# Patient Record
Sex: MALE | Race: WHITE | HISPANIC OR LATINO | Employment: FULL TIME | URBAN - METROPOLITAN AREA
[De-identification: names, ages, dates, MRNs, and addresses within clinical notes are randomized per-mention and may not be internally consistent; named-entity substitution may affect disease eponyms.]

---

## 2020-03-19 ENCOUNTER — OFFICE VISIT (OUTPATIENT)
Dept: URGENT CARE | Facility: CLINIC | Age: 25
End: 2020-03-19
Payer: COMMERCIAL

## 2020-03-19 VITALS
RESPIRATION RATE: 16 BRPM | SYSTOLIC BLOOD PRESSURE: 130 MMHG | TEMPERATURE: 99 F | OXYGEN SATURATION: 96 % | DIASTOLIC BLOOD PRESSURE: 80 MMHG | WEIGHT: 255 LBS | BODY MASS INDEX: 35.7 KG/M2 | HEIGHT: 71 IN | HEART RATE: 92 BPM

## 2020-03-19 DIAGNOSIS — K52.9 GASTROENTERITIS: ICD-10-CM

## 2020-03-19 PROCEDURE — 99203 OFFICE O/P NEW LOW 30 MIN: CPT | Performed by: PHYSICIAN ASSISTANT

## 2020-03-19 ASSESSMENT — ENCOUNTER SYMPTOMS
NAUSEA: 1
DIARRHEA: 1
VOMITING: 1
HEARTBURN: 0
BLOOD IN STOOL: 0
COUGH: 0
ABDOMINAL PAIN: 1
CONSTIPATION: 0
CHILLS: 0
FEVER: 0

## 2020-03-19 NOTE — PROGRESS NOTES
"Subjective:      Ryan Riddle is a 24 y.o. male who presents with Abdominal Pain (x1 day needs work note )            Patient is a 24-year-old male who presents to urgent care with nausea, vomiting and diarrhea after eating specific to spoiled chicken salad.  Consumption was 2 days ago of which yesterday patient reports notable symptoms however this subsided around 3 PM.  Patient denies any fevers, body aches and is able to tolerate food and water at this time.  Patient is feeling substantially better at this time however needs a work release.    Gastroenteritis    This is a new problem. Episode onset: 2 days ago. The problem has been resolved. The emesis has an appearance of bile. There has been no fever. Associated symptoms include abdominal pain and diarrhea. Pertinent negatives include no chills, coughing or fever. Associated symptoms comments: Crampy abdominal pain. Risk factors include suspect food intake. He has tried nothing for the symptoms.       Review of Systems   Constitutional: Negative for chills, fever and malaise/fatigue.   Respiratory: Negative for cough.    Gastrointestinal: Positive for abdominal pain, diarrhea, nausea and vomiting. Negative for blood in stool, constipation, heartburn and melena.   Genitourinary: Negative for dysuria and urgency.   All other systems reviewed and are negative.         Objective:     /80   Pulse 92   Temp 37.2 °C (99 °F) (Temporal)   Resp 16   Ht 1.803 m (5' 11\")   Wt 115.7 kg (255 lb)   SpO2 96%   BMI 35.57 kg/m²    PMH:  has a past medical history of Unspecified asthma(493.90).  MEDS: Reviewed .   ALLERGIES: No Known Allergies  SURGHX: No past surgical history on file.  SOCHX:  reports that he has never smoked. He has never used smokeless tobacco. He reports that he does not drink alcohol or use drugs.  FH: Family history was reviewed, no pertinent findings to report    Physical Exam  Vitals signs reviewed.   Constitutional:       General: " He is not in acute distress.     Appearance: He is well-developed.   HENT:      Head: Normocephalic and atraumatic.      Right Ear: External ear normal.      Left Ear: External ear normal.      Mouth/Throat:      Pharynx: No oropharyngeal exudate.   Eyes:      Conjunctiva/sclera: Conjunctivae normal.      Pupils: Pupils are equal, round, and reactive to light.   Neck:      Musculoskeletal: Normal range of motion and neck supple.      Trachea: No tracheal deviation.   Cardiovascular:      Rate and Rhythm: Normal rate and regular rhythm.      Heart sounds: No murmur.   Pulmonary:      Effort: Pulmonary effort is normal. No respiratory distress.      Breath sounds: Normal breath sounds.   Musculoskeletal: Normal range of motion.   Skin:     General: Skin is warm.      Findings: No rash.   Neurological:      Mental Status: He is alert and oriented to person, place, and time.      Coordination: Coordination normal.   Psychiatric:         Behavior: Behavior normal.         Thought Content: Thought content normal.         Judgment: Judgment normal.                 Assessment/Plan:       1. Gastroenteritis    Symptoms have since resolved.  Work note provided for patient to return to work immediately as he is currently asymptomatic for 24 hours.  Patient is to return to clinic for any further concern or issues.    RTC PRN.

## 2020-03-19 NOTE — LETTER
March 19, 2020         Patient: Ryan Riddle   YOB: 1995   Date of Visit: 3/19/2020           To Whom it May Concern:    Ryan Riddle was seen in my clinic on 3/19/2020. Please excuse this patient from work due to recent ailment, he is completely asymptomatic and may return to work immediately.     If you have any questions or concerns, please don't hesitate to call.        Sincerely,           Eliceo Arce P.A.-C.  Electronically Signed

## 2020-09-08 ENCOUNTER — APPOINTMENT (OUTPATIENT)
Dept: RADIOLOGY | Facility: MEDICAL CENTER | Age: 25
End: 2020-09-08
Attending: EMERGENCY MEDICINE
Payer: COMMERCIAL

## 2020-09-08 ENCOUNTER — HOSPITAL ENCOUNTER (EMERGENCY)
Facility: MEDICAL CENTER | Age: 25
End: 2020-09-08
Attending: EMERGENCY MEDICINE
Payer: COMMERCIAL

## 2020-09-08 VITALS
DIASTOLIC BLOOD PRESSURE: 75 MMHG | HEART RATE: 72 BPM | TEMPERATURE: 98.9 F | BODY MASS INDEX: 36.36 KG/M2 | HEIGHT: 71 IN | OXYGEN SATURATION: 96 % | SYSTOLIC BLOOD PRESSURE: 143 MMHG | RESPIRATION RATE: 16 BRPM | WEIGHT: 259.7 LBS

## 2020-09-08 DIAGNOSIS — K57.92 DIVERTICULITIS: ICD-10-CM

## 2020-09-08 DIAGNOSIS — R10.32 LEFT LOWER QUADRANT ABDOMINAL PAIN: ICD-10-CM

## 2020-09-08 LAB
ALBUMIN SERPL BCP-MCNC: 4.2 G/DL (ref 3.2–4.9)
ALBUMIN/GLOB SERPL: 1.1 G/DL
ALP SERPL-CCNC: 68 U/L (ref 30–99)
ALT SERPL-CCNC: 24 U/L (ref 2–50)
ANION GAP SERPL CALC-SCNC: 13 MMOL/L (ref 7–16)
APPEARANCE UR: CLEAR
AST SERPL-CCNC: 18 U/L (ref 12–45)
BASOPHILS # BLD AUTO: 0.3 % (ref 0–1.8)
BASOPHILS # BLD: 0.03 K/UL (ref 0–0.12)
BILIRUB SERPL-MCNC: 0.4 MG/DL (ref 0.1–1.5)
BILIRUB UR QL STRIP.AUTO: NEGATIVE
BUN SERPL-MCNC: 9 MG/DL (ref 8–22)
CALCIUM SERPL-MCNC: 9.5 MG/DL (ref 8.4–10.2)
CHLORIDE SERPL-SCNC: 103 MMOL/L (ref 96–112)
CO2 SERPL-SCNC: 25 MMOL/L (ref 20–33)
COLOR UR: YELLOW
CREAT SERPL-MCNC: 0.74 MG/DL (ref 0.5–1.4)
EOSINOPHIL # BLD AUTO: 0.17 K/UL (ref 0–0.51)
EOSINOPHIL NFR BLD: 1.6 % (ref 0–6.9)
ERYTHROCYTE [DISTWIDTH] IN BLOOD BY AUTOMATED COUNT: 38.6 FL (ref 35.9–50)
GLOBULIN SER CALC-MCNC: 3.7 G/DL (ref 1.9–3.5)
GLUCOSE SERPL-MCNC: 105 MG/DL (ref 65–99)
GLUCOSE UR STRIP.AUTO-MCNC: NEGATIVE MG/DL
HCT VFR BLD AUTO: 43.3 % (ref 42–52)
HGB BLD-MCNC: 14.9 G/DL (ref 14–18)
IMM GRANULOCYTES # BLD AUTO: 0.04 K/UL (ref 0–0.11)
IMM GRANULOCYTES NFR BLD AUTO: 0.4 % (ref 0–0.9)
KETONES UR STRIP.AUTO-MCNC: NEGATIVE MG/DL
LEUKOCYTE ESTERASE UR QL STRIP.AUTO: NEGATIVE
LIPASE SERPL-CCNC: 19 U/L (ref 7–58)
LYMPHOCYTES # BLD AUTO: 1.98 K/UL (ref 1–4.8)
LYMPHOCYTES NFR BLD: 18.6 % (ref 22–41)
MCH RBC QN AUTO: 30.4 PG (ref 27–33)
MCHC RBC AUTO-ENTMCNC: 34.4 G/DL (ref 33.7–35.3)
MCV RBC AUTO: 88.4 FL (ref 81.4–97.8)
MICRO URNS: NORMAL
MONOCYTES # BLD AUTO: 0.76 K/UL (ref 0–0.85)
MONOCYTES NFR BLD AUTO: 7.1 % (ref 0–13.4)
NEUTROPHILS # BLD AUTO: 7.68 K/UL (ref 1.82–7.42)
NEUTROPHILS NFR BLD: 72 % (ref 44–72)
NITRITE UR QL STRIP.AUTO: NEGATIVE
NRBC # BLD AUTO: 0 K/UL
NRBC BLD-RTO: 0 /100 WBC
PH UR STRIP.AUTO: 6.5 [PH] (ref 5–8)
PLATELET # BLD AUTO: 263 K/UL (ref 164–446)
PMV BLD AUTO: 10 FL (ref 9–12.9)
POTASSIUM SERPL-SCNC: 3.8 MMOL/L (ref 3.6–5.5)
PROT SERPL-MCNC: 7.9 G/DL (ref 6–8.2)
PROT UR QL STRIP: NEGATIVE MG/DL
RBC # BLD AUTO: 4.9 M/UL (ref 4.7–6.1)
RBC UR QL AUTO: NEGATIVE
SODIUM SERPL-SCNC: 141 MMOL/L (ref 135–145)
SP GR UR STRIP.AUTO: <=1.005
WBC # BLD AUTO: 10.7 K/UL (ref 4.8–10.8)

## 2020-09-08 PROCEDURE — 85025 COMPLETE CBC W/AUTO DIFF WBC: CPT

## 2020-09-08 PROCEDURE — 99284 EMERGENCY DEPT VISIT MOD MDM: CPT

## 2020-09-08 PROCEDURE — 81003 URINALYSIS AUTO W/O SCOPE: CPT

## 2020-09-08 PROCEDURE — 76870 US EXAM SCROTUM: CPT

## 2020-09-08 PROCEDURE — 96375 TX/PRO/DX INJ NEW DRUG ADDON: CPT

## 2020-09-08 PROCEDURE — 96374 THER/PROPH/DIAG INJ IV PUSH: CPT

## 2020-09-08 PROCEDURE — 80053 COMPREHEN METABOLIC PANEL: CPT

## 2020-09-08 PROCEDURE — 87591 N.GONORRHOEAE DNA AMP PROB: CPT

## 2020-09-08 PROCEDURE — 700111 HCHG RX REV CODE 636 W/ 250 OVERRIDE (IP)

## 2020-09-08 PROCEDURE — 87491 CHLMYD TRACH DNA AMP PROBE: CPT

## 2020-09-08 PROCEDURE — 83690 ASSAY OF LIPASE: CPT

## 2020-09-08 PROCEDURE — 74176 CT ABD & PELVIS W/O CONTRAST: CPT

## 2020-09-08 PROCEDURE — 700111 HCHG RX REV CODE 636 W/ 250 OVERRIDE (IP): Performed by: EMERGENCY MEDICINE

## 2020-09-08 RX ORDER — CIPROFLOXACIN 500 MG/1
500 TABLET, FILM COATED ORAL 2 TIMES DAILY
Qty: 20 TAB | Refills: 0 | Status: SHIPPED | OUTPATIENT
Start: 2020-09-08 | End: 2020-09-08 | Stop reason: SDUPTHER

## 2020-09-08 RX ORDER — KETOROLAC TROMETHAMINE 30 MG/ML
INJECTION, SOLUTION INTRAMUSCULAR; INTRAVENOUS
Status: COMPLETED
Start: 2020-09-08 | End: 2020-09-08

## 2020-09-08 RX ORDER — CIPROFLOXACIN 500 MG/1
500 TABLET, FILM COATED ORAL 2 TIMES DAILY
Qty: 20 TAB | Refills: 0 | Status: SHIPPED | OUTPATIENT
Start: 2020-09-08 | End: 2020-09-18

## 2020-09-08 RX ORDER — KETOROLAC TROMETHAMINE 30 MG/ML
30 INJECTION, SOLUTION INTRAMUSCULAR; INTRAVENOUS ONCE
Status: COMPLETED | OUTPATIENT
Start: 2020-09-08 | End: 2020-09-08

## 2020-09-08 RX ORDER — ONDANSETRON 2 MG/ML
4 INJECTION INTRAMUSCULAR; INTRAVENOUS ONCE
Status: COMPLETED | OUTPATIENT
Start: 2020-09-08 | End: 2020-09-08

## 2020-09-08 RX ORDER — ALBUTEROL SULFATE 90 UG/1
2 AEROSOL, METERED RESPIRATORY (INHALATION) EVERY 6 HOURS PRN
COMMUNITY
End: 2021-03-30 | Stop reason: SDUPTHER

## 2020-09-08 RX ORDER — HYDROMORPHONE HYDROCHLORIDE 1 MG/ML
1 INJECTION, SOLUTION INTRAMUSCULAR; INTRAVENOUS; SUBCUTANEOUS ONCE
Status: COMPLETED | OUTPATIENT
Start: 2020-09-08 | End: 2020-09-08

## 2020-09-08 RX ORDER — METRONIDAZOLE 500 MG/1
500 TABLET ORAL 3 TIMES DAILY
Qty: 30 TAB | Refills: 0 | Status: SHIPPED | OUTPATIENT
Start: 2020-09-08 | End: 2020-09-18

## 2020-09-08 RX ORDER — METRONIDAZOLE 500 MG/1
500 TABLET ORAL 3 TIMES DAILY
Qty: 30 TAB | Refills: 0 | Status: SHIPPED | OUTPATIENT
Start: 2020-09-08 | End: 2020-09-08 | Stop reason: SDUPTHER

## 2020-09-08 RX ORDER — HYDROCODONE BITARTRATE AND ACETAMINOPHEN 5; 325 MG/1; MG/1
1 TABLET ORAL EVERY 6 HOURS PRN
Qty: 10 TAB | Refills: 0 | Status: SHIPPED | OUTPATIENT
Start: 2020-09-08 | End: 2020-09-13

## 2020-09-08 RX ORDER — ONDANSETRON 4 MG/1
4 TABLET, FILM COATED ORAL EVERY 6 HOURS PRN
Qty: 10 TAB | Refills: 0 | Status: SHIPPED | OUTPATIENT
Start: 2020-09-08 | End: 2020-09-13

## 2020-09-08 RX ADMIN — HYDROMORPHONE HYDROCHLORIDE 1 MG: 1 INJECTION, SOLUTION INTRAMUSCULAR; INTRAVENOUS; SUBCUTANEOUS at 14:25

## 2020-09-08 RX ADMIN — KETOROLAC TROMETHAMINE 30 MG: 30 INJECTION, SOLUTION INTRAMUSCULAR; INTRAVENOUS at 14:26

## 2020-09-08 RX ADMIN — ONDANSETRON 4 MG: 2 INJECTION INTRAMUSCULAR; INTRAVENOUS at 14:24

## 2020-09-08 RX ADMIN — KETOROLAC TROMETHAMINE 30 MG: 30 INJECTION, SOLUTION INTRAMUSCULAR at 14:26

## 2020-09-08 NOTE — ED TRIAGE NOTES
Pt reports L sided abd pain x 4 days that now wraps down into groin area. Denies n/v/d or fever.    Pt denies covid symptoms or known exposure.

## 2020-09-08 NOTE — ED PROVIDER NOTES
"ED Provider Note    CHIEF COMPLAINT  Chief Complaint   Patient presents with   • Abdominal Pain       HPI  Ryan Washington is a 25 y.o. male who presents for evaluation of abdominal pain.  Patient states over the last 4 days he has been having left lower quadrant abdominal pain which is worsened over the last 24 hours.  He states the pain will radiate into the left hemiscrotum and he does have some testicular pain.  Patient states he feels like he may have a little bit of dysuria.  He denies: Fever, URI symptoms, cardiorespiratory symptoms, nausea, vomiting, hematemesis, melena hematochezia, penile discharge, flank pain.  No other acute symptomatology or complaints.    REVIEW OF SYSTEMS  See HPI for further details.  The patient denies: Hypertension, diabetes, thyroid dysfunction, seizures, cardiopulmonary disorders, previous gastrointestinal disorders, previous kidney or liver disorders.  All other systems negative.    PAST MEDICAL HISTORY  Past Medical History:   Diagnosis Date   • Unspecified asthma(493.90)        FAMILY HISTORY  No family history on file.    SOCIAL HISTORY  No history of tobacco, alcohol, drug abuse; the patient is monogamous with his girlfriend;    SURGICAL HISTORY  No past surgical history on file.    CURRENT MEDICATIONS  See nurse's notes    ALLERGIES  No Known Allergies    PHYSICAL EXAM  VITAL SIGNS: /72   Pulse 85   Temp 37.2 °C (98.9 °F) (Temporal)   Resp 16   Ht 1.803 m (5' 11\")   Wt 117.8 kg (259 lb 11.2 oz)   SpO2 97%   BMI 36.22 kg/m²    Constitutional: 25-year-old  male, well developed, Well nourished, No acute distress, Non-toxic appearance.   HENT: ,Atraumatic, Bilateral external ears normal, tympanic membranes clear, Oropharynx moist, No oral exudates, Nose normal.   Eyes: PERRL, EOMI, Conjunctiva normal, No discharge.   Neck: Normal range of motion, No tenderness, Supple, No stridor.   Lymphatic: No lymphadenopathy noted.   Cardiovascular: Normal heart " rate, Normal rhythm, No murmurs, No rubs, No gallops.   Thorax & Lungs: Normal Equal breath sounds, No respiratory distress, No wheezing, no stridor, no rales. No chest tenderness.   Abdomen: Mild tenderness in the left lower quadrant area, nondistended, no organomegaly, positive bowel sounds normal in quality. No guarding or rebound.  Skin: Good skin turgor, pink, warm, dry. No rashes, petechiae, purpura. Normal capillary refill.   Back: No tenderness, No CVA tenderness.   Genitalia: External genitalia appear normal, mild tenderness in the left hemiscrotum but no obvious masses or swelling identified, the penis is uncircumcised without lesions. No discharge.   Extremities: Intact distal pulses, No edema, No tenderness, No cyanosis, No clubbing. Vascular: Pulses are 2+, symmetric in the upper and lower extremities.  Musculoskeletal: Good range of motion in all major joints. No tenderness to palpation or major deformities noted.   Neurologic: Alert & oriented x 3, Normal motor function, Normal sensory function, No gross focal deficits noted.   Psychiatric: Affect normal, Judgment normal, Mood normal.     RADIOLOGY/PROCEDURES  CT-RENAL COLIC EVALUATION(A/P W/O)   Final Result      1.  Inflammatory change adjacent to the sigmoid colon most consistent with acute diverticulitis      2.  No other finding      [id number]   Final Result      1.  No evidence of testicular mass or torsion.      2.  3 mm left epididymal head cyst.            COURSE & MEDICAL DECISION MAKING  Pertinent Labs & Imaging studies reviewed. (See chart for details)  1.  Monitor  2.  Saline lock  3.  Zofran 4 mg IV, titrated  4.  Dilaudid 1 mg IV  5.  Toradol 30 mg IV    Laboratory studies: CBC shows white count of 10.7, 72% neutrophils, 18% lymphocytes, 7% monocytes, hemoglobin 14.9, crit 43.3; CMP shows random glucose 105 globulin 3.7, otherwise within normal; lipase 19;    Discussion: At this time, the patient presents for evaluation of  abdominal pain.  Patient has mild tenderness in the scrotum but no masses or swelling or erythema identified.  Ultrasonography shows no evidence of mass or torsion.  There is a small epididymal cyst.  CT scanning does show inflammatory changes adjacent to the sigmoid colon most consistent with diverticulitis.  There is no evidence of perforation or abscess formation or other complications or conditions.  Based on the patient's clinical findings, I feel we can attempt a trial of outpatient therapy.  I discussed the findings and treatment plan with the patient.  He indicates he is comfortable with this explanation disposition.    FINAL IMPRESSION  1. Left lower quadrant abdominal pain Acute   2. Diverticulitis Acute          PLAN  1.  Appropriate discharge instructions given  2.  Cipro 500 mg twice daily #20  3.  Flagyl 501 g 3 times daily #30  4.  Zofran 4 mg #10  5.  Lortab 5 mg #10;  databank reviewed; informed consent obtained;  6.  Follow-up with primary care  7.  Recheck if change or worsening of symptoms  8.  Follow-up with gastroenterology    Electronically signed by: Guy G Gansert, M.D., 9/8/2020 2:04 PM

## 2020-09-08 NOTE — ED NOTES
Pt given discharge instructions. RN answered questions. VSS. Pt ambulated steadily out to [facility].

## 2020-09-09 LAB
C TRACH DNA SPEC QL NAA+PROBE: NEGATIVE
N GONORRHOEA DNA SPEC QL NAA+PROBE: NEGATIVE
SPECIMEN SOURCE: NORMAL

## 2021-03-05 ENCOUNTER — TELEPHONE (OUTPATIENT)
Dept: SCHEDULING | Facility: IMAGING CENTER | Age: 26
End: 2021-03-05

## 2021-03-25 SDOH — HEALTH STABILITY: PHYSICAL HEALTH: ON AVERAGE, HOW MANY DAYS PER WEEK DO YOU ENGAGE IN MODERATE TO STRENUOUS EXERCISE (LIKE A BRISK WALK)?: 3 DAYS

## 2021-03-25 SDOH — HEALTH STABILITY: MENTAL HEALTH
STRESS IS WHEN SOMEONE FEELS TENSE, NERVOUS, ANXIOUS, OR CAN'T SLEEP AT NIGHT BECAUSE THEIR MIND IS TROUBLED. HOW STRESSED ARE YOU?: DECLINE

## 2021-03-25 SDOH — ECONOMIC STABILITY: INCOME INSECURITY: IN THE LAST 12 MONTHS, WAS THERE A TIME WHEN YOU WERE NOT ABLE TO PAY THE MORTGAGE OR RENT ON TIME?: NO

## 2021-03-25 SDOH — ECONOMIC STABILITY: TRANSPORTATION INSECURITY
IN THE PAST 12 MONTHS, HAS LACK OF RELIABLE TRANSPORTATION KEPT YOU FROM MEDICAL APPOINTMENTS, MEETINGS, WORK OR FROM GETTING THINGS NEEDED FOR DAILY LIVING?: NO

## 2021-03-25 SDOH — ECONOMIC STABILITY: TRANSPORTATION INSECURITY
IN THE PAST 12 MONTHS, HAS THE LACK OF TRANSPORTATION KEPT YOU FROM MEDICAL APPOINTMENTS OR FROM GETTING MEDICATIONS?: NO

## 2021-03-25 SDOH — ECONOMIC STABILITY: HOUSING INSECURITY
IN THE LAST 12 MONTHS, WAS THERE A TIME WHEN YOU DID NOT HAVE A STEADY PLACE TO SLEEP OR SLEPT IN A SHELTER (INCLUDING NOW)?: NO

## 2021-03-25 SDOH — HEALTH STABILITY: PHYSICAL HEALTH: ON AVERAGE, HOW MANY MINUTES DO YOU ENGAGE IN EXERCISE AT THIS LEVEL?: 60 MINUTES

## 2021-03-25 SDOH — ECONOMIC STABILITY: HOUSING INSECURITY

## 2021-03-25 ASSESSMENT — SOCIAL DETERMINANTS OF HEALTH (SDOH)
DO YOU BELONG TO ANY CLUBS OR ORGANIZATIONS SUCH AS CHURCH GROUPS UNIONS, FRATERNAL OR ATHLETIC GROUPS, OR SCHOOL GROUPS?: NO
HOW MANY DRINKS CONTAINING ALCOHOL DO YOU HAVE ON A TYPICAL DAY WHEN YOU ARE DRINKING: 1 OR 2
HOW OFTEN DO YOU GET TOGETHER WITH FRIENDS OR RELATIVES?: TWICE A WEEK
WITHIN THE PAST 12 MONTHS, THE FOOD YOU BOUGHT JUST DIDN'T LAST AND YOU DIDN'T HAVE MONEY TO GET MORE: NEVER TRUE
WITHIN THE PAST 12 MONTHS, YOU WORRIED THAT YOUR FOOD WOULD RUN OUT BEFORE YOU GOT THE MONEY TO BUY MORE: NEVER TRUE
ARE YOU MARRIED, WIDOWED, DIVORCED, SEPARATED, NEVER MARRIED, OR LIVING WITH A PARTNER?: NEVER MARRIED
HOW OFTEN DO YOU HAVE A DRINK CONTAINING ALCOHOL: MONTHLY OR LESS
HOW OFTEN DO YOU ATTENT MEETINGS OF THE CLUB OR ORGANIZATION YOU BELONG TO?: NEVER
IN A TYPICAL WEEK, HOW MANY TIMES DO YOU TALK ON THE PHONE WITH FAMILY, FRIENDS, OR NEIGHBORS?: NEVER
HOW OFTEN DO YOU ATTEND CHURCH OR RELIGIOUS SERVICES?: NEVER
HOW HARD IS IT FOR YOU TO PAY FOR THE VERY BASICS LIKE FOOD, HOUSING, MEDICAL CARE, AND HEATING?: NOT HARD AT ALL
HOW OFTEN DO YOU HAVE SIX OR MORE DRINKS ON ONE OCCASION: NEVER

## 2021-03-30 ENCOUNTER — OFFICE VISIT (OUTPATIENT)
Dept: MEDICAL GROUP | Facility: MEDICAL CENTER | Age: 26
End: 2021-03-30
Payer: COMMERCIAL

## 2021-03-30 VITALS
OXYGEN SATURATION: 98 % | WEIGHT: 248.35 LBS | HEIGHT: 71 IN | SYSTOLIC BLOOD PRESSURE: 162 MMHG | DIASTOLIC BLOOD PRESSURE: 88 MMHG | BODY MASS INDEX: 34.77 KG/M2 | RESPIRATION RATE: 18 BRPM | TEMPERATURE: 97.7 F | HEART RATE: 82 BPM

## 2021-03-30 DIAGNOSIS — Z13.6 ENCOUNTER FOR LIPID SCREENING FOR CARDIOVASCULAR DISEASE: ICD-10-CM

## 2021-03-30 DIAGNOSIS — Z13.1 SCREENING FOR DIABETES MELLITUS: ICD-10-CM

## 2021-03-30 DIAGNOSIS — J45.30 MILD PERSISTENT ASTHMA WITHOUT COMPLICATION: ICD-10-CM

## 2021-03-30 DIAGNOSIS — Z13.21 ENCOUNTER FOR VITAMIN DEFICIENCY SCREENING: ICD-10-CM

## 2021-03-30 DIAGNOSIS — R03.0 ELEVATED BLOOD PRESSURE READING IN OFFICE WITHOUT DIAGNOSIS OF HYPERTENSION: ICD-10-CM

## 2021-03-30 DIAGNOSIS — E66.9 OBESITY (BMI 30-39.9): ICD-10-CM

## 2021-03-30 DIAGNOSIS — Z13.220 ENCOUNTER FOR LIPID SCREENING FOR CARDIOVASCULAR DISEASE: ICD-10-CM

## 2021-03-30 PROCEDURE — 99214 OFFICE O/P EST MOD 30 MIN: CPT | Performed by: FAMILY MEDICINE

## 2021-03-30 RX ORDER — MONTELUKAST SODIUM 10 MG/1
10 TABLET ORAL EVERY EVENING
Qty: 30 TABLET | Refills: 2 | Status: SHIPPED | OUTPATIENT
Start: 2021-03-30 | End: 2021-05-28

## 2021-03-30 RX ORDER — ALBUTEROL SULFATE 90 UG/1
2 AEROSOL, METERED RESPIRATORY (INHALATION) EVERY 6 HOURS PRN
Qty: 8.5 G | Refills: 4 | Status: SHIPPED | OUTPATIENT
Start: 2021-03-30 | End: 2021-12-02

## 2021-03-30 ASSESSMENT — ENCOUNTER SYMPTOMS
WEIGHT LOSS: 0
BLURRED VISION: 0
PALPITATIONS: 0
DIARRHEA: 0
MYALGIAS: 0
DIZZINESS: 0
SHORTNESS OF BREATH: 0
DEPRESSION: 0
DOUBLE VISION: 0
CONSTIPATION: 0
NAUSEA: 0
FEVER: 0
WEAKNESS: 0
COUGH: 0
BRUISES/BLEEDS EASILY: 0
CHILLS: 0

## 2021-03-30 ASSESSMENT — PATIENT HEALTH QUESTIONNAIRE - PHQ9: CLINICAL INTERPRETATION OF PHQ2 SCORE: 0

## 2021-03-30 ASSESSMENT — FIBROSIS 4 INDEX: FIB4 SCORE: 0.36

## 2021-03-30 NOTE — LETTER
[facility]  DARLING RothRSawyerN.  [address]  [place] [address]  Fax: [phone]   Authorization for Release/Disclosure of   Protected Health Information   Name: RYAN WASHINGTON : 1995 SSN: [id number]   Address: [address] [place] [address]  [place] [address] Phone:    There are no phone numbers on file.   I authorize the entity listed below to release/disclose the PHI below to:   [facility]/LAZARO Roth and LAZARO Roth   Provider or Entity Name:     Address   City, State, Zip   Phone:      Fax:     Reason for request: continuity of care   Information to be released:    [  ] LAST COLONOSCOPY,  including any PATH REPORT and follow-up  [  ] LAST FIT/COLOGUARD RESULT [  ] LAST DEXA  [  ] LAST MAMMOGRAM  [  ] LAST PAP  [  ] LAST LABS [  ] RETINA EXAM REPORT  [  ] IMMUNIZATION RECORDS  [  ] Release all info      [  ] Check here and initial the line next to each item to release ALL health information INCLUDING  _____ Care and treatment for drug and / or alcohol abuse  _____ HIV testing, infection status, or AIDS  _____ Genetic Testing    DATES OF SERVICE OR TIME PERIOD TO BE DISCLOSED: _____________  I understand and acknowledge that:  * This Authorization may be revoked at any time by you in writing, except if your health information has already been used or disclosed.  * Your health information that will be used or disclosed as a result of you signing this authorization could be re-disclosed by the recipient. If this occurs, your re-disclosed health information may no longer be protected by State or Federal laws.  * You may refuse to sign this Authorization. Your refusal will not affect your ability to obtain treatment.  * This Authorization becomes effective upon signing and will  on (date) __________.      If no date is indicated, this Authorization will  one (1) year from the signature date.    Name: Ryan Washington    Signature:   Date:      3/30/2021       PLEASE FAX REQUESTED RECORDS BACK TO: [phone]

## 2021-03-31 NOTE — ASSESSMENT & PLAN NOTE
Patient reports he was diagnosed with asthma as a child.  He was on albuterol inhaler as needed and started on Asmanex over a year ago by his previous provider.  He feels that the Asmanex and albuterol do a good job of controlling his asthma symptoms.  Uses the albuterol inhaler approx 3 to 4 times a week, with no night time awakening and it has been a few years since he has taken oral steroids.  Worsening of his asthma symptoms during allergy season, but is able to control this with Flonase nasal spray.    Denies shortness of breath, wheezing, difficulty breathing.

## 2021-03-31 NOTE — ASSESSMENT & PLAN NOTE
He has started to lose weight since his diagnosis with diverticulitis.    Diet: He eats lots of fruits, and will occasionally eat vegetables.  He avoids red meats but does eats a lot of chicken.  He does report that he eats a lot of rice but wants to try quinoa as a rice replacement.      Exercise: 3 to 4 days a week, approx 1 hr.  Goes to the gym, and Monday will play softball.

## 2021-03-31 NOTE — ASSESSMENT & PLAN NOTE
Patient with elevated blood pressure of 162/88.  He currently denies any chest pain, shortness of breath, headache, vision changes, diaphoresis.

## 2021-03-31 NOTE — PROGRESS NOTES
Ryan Washington is a pleasant 25 y.o. male here to establish care.    HPI:    Mild persistent asthma without complication  Patient reports he was diagnosed with asthma as a child.  He was on albuterol inhaler as needed and started on Asmanex over a year ago by his previous provider.  He feels that the Asmanex and albuterol do a good job of controlling his asthma symptoms.  Uses the albuterol inhaler approx 3 to 4 times a week, with no night time awakening and it has been a few years since he has taken oral steroids.  Worsening of his asthma symptoms during allergy season, but is able to control this with Flonase nasal spray.    Denies shortness of breath, wheezing, difficulty breathing.    Obesity (BMI 30-39.9)  He has started to lose weight since his diagnosis with diverticulitis.    Diet: He eats lots of fruits, and will occasionally eat vegetables.  He avoids red meats but does eats a lot of chicken.  He does report that he eats a lot of rice but wants to try quinoa as a rice replacement.      Exercise: 3 to 4 days a week, approx 1 hr.  Goes to the gym, and Monday will play softball.        Elevated blood pressure reading in office without diagnosis of hypertension  Patient with elevated blood pressure of 162/88.  He currently denies any chest pain, shortness of breath, headache, vision changes, diaphoresis.      Current medicines (including changes today)  Current Outpatient Medications   Medication Sig Dispense Refill   • Fluticasone Propionate (FLONASE ALLERGY RELIEF NA) Administer  into affected nostril(S).     • Psyllium (METAMUCIL PO) Take  by mouth.     • albuterol 108 (90 Base) MCG/ACT Aero Soln inhalation aerosol Inhale 2 Puffs every 6 hours as needed for Shortness of Breath. 8.5 g 4   • mometasone (ASMANEX, 30 METERED DOSES,) 220 MCG/INH inhaler Inhale 1 Puff every bedtime. 3 Each 1   • montelukast (SINGULAIR) 10 MG Tab Take 1 tablet by mouth every evening. 30 tablet 2     No current  "facility-administered medications for this visit.       Past Medical/ Surgical History  He  has a past medical history of Diverticulitis and Unspecified asthma(493.90).  He  has no past surgical history on file.    Social History  Social History     Tobacco Use   • Smoking status: Never Smoker   • Smokeless tobacco: Former User   Substance Use Topics   • Alcohol use: Not Currently     Comment: heavy drinker prior to diagnosis   • Drug use: Yes     Types: Marijuana, Inhaled     Social History     Social History Narrative   • Not on file        Family History  Family History   Problem Relation Age of Onset   • Diabetes Mother    • No Known Problems Father    • Diabetes Maternal Grandmother    • Diabetes Maternal Grandfather    • Diabetes Paternal Grandfather    • Other Brother         diverticulitis   • Sleep Apnea Brother      Family Status   Relation Name Status   • Mo  Alive   • Fa  Alive   • Sis Altagracia Alive   • Bro Nino Alive   • MGMo     • MGFa     • PGMo     • PGFa     • Bro Philippe Alive   • Bro serena Alive         Review of Systems   Constitutional: Negative for chills, fever and weight loss.   HENT: Negative for hearing loss.    Eyes: Negative for blurred vision and double vision.   Respiratory: Negative for cough and shortness of breath.    Cardiovascular: Negative for chest pain, palpitations and leg swelling.   Gastrointestinal: Negative for constipation, diarrhea and nausea.   Genitourinary: Negative.    Musculoskeletal: Negative for myalgias.   Skin: Negative for rash.   Neurological: Negative for dizziness and weakness.   Endo/Heme/Allergies: Does not bruise/bleed easily.   Psychiatric/Behavioral: Negative for depression.         Objective:     BP (!) 162/88 (BP Location: Left arm, Patient Position: Sitting, BP Cuff Size: Adult)   Pulse 82   Temp 36.5 °C (97.7 °F) (Temporal)   Resp 18   Ht 1.803 m (5' 11\")   Wt 113 kg (248 lb 5.6 oz)   SpO2 98%  Body mass index is 34.64 " kg/m².    Physical Exam   Constitutional: He is oriented to person, place, and time and well-developed, well-nourished, and in no distress. No distress.   HENT:   Head: Normocephalic and atraumatic.   Right Ear: External ear normal.   Left Ear: External ear normal.   Eyes: Pupils are equal, round, and reactive to light. Conjunctivae are normal.   Cardiovascular: Normal rate and regular rhythm. Exam reveals no gallop and no friction rub.   No murmur heard.  Pulmonary/Chest: Effort normal and breath sounds normal. He has no wheezes. He has no rales.   Abdominal: Soft. Bowel sounds are normal. There is no abdominal tenderness.   Musculoskeletal:      Cervical back: Normal range of motion and neck supple.   Neurological: He is alert and oriented to person, place, and time. Gait normal.   Skin: Skin is warm and dry. No rash noted.   Acanthosis nigricans noted to the back of patient's neck   Psychiatric: Affect normal.      Labs:  Most recent labs from 09/28/2020 reviewed.    Assessment and Plan:   The following treatment plan was discussed    1. Mild persistent asthma without complication  Chronic, unstable.  I discussed with the patient about not wanting to be on an inhaled corticosteroid for a long period of time.  Due to patient's increase in asthma symptoms during allergy season I went ahead and prescribed him montelukast to take daily.  We will discuss further adjustments to his asthma medication regime and her next appointment.  - albuterol 108 (90 Base) MCG/ACT Aero Soln inhalation aerosol; Inhale 2 Puffs every 6 hours as needed for Shortness of Breath.  Dispense: 8.5 g; Refill: 4  - mometasone (ASMANEX, 30 METERED DOSES,) 220 MCG/INH inhaler; Inhale 1 Puff every bedtime.  Dispense: 3 Each; Refill: 1  - montelukast (SINGULAIR) 10 MG Tab; Take 1 tablet by mouth every evening.  Dispense: 30 tablet; Refill: 2    2. Obesity (BMI 30-39.9)  Chronic, improving.  Patient states that he has started to lose weight since  changing his diet and increasing his physical activity.  I further encouraged him to continue making these changes and will see further improvement with his weight and overall health.  - Patient identified as having weight management issue.  Appropriate orders and counseling given.    3. Screening for diabetes mellitus  Acanthosis nigricans noted to the back of patient's neck along with a glucose level drawn 6 months ago at 105.  Due to patient's significant family history for diabetes we will check an A1c.  - HEMOGLOBIN A1C; Future    4. Encounter for lipid screening for cardiovascular disease  - Lipid Profile; Future    5. Encounter for vitamin deficiency screening  - VITAMIN D,25 HYDROXY; Future    6. Elevated blood pressure reading in office without diagnosis of hypertension  Patient reports that he has a home BP monitor, I advised that he obtain several different blood pressure readings at different times and bring these back to me with her follow-up appointment in 4 weeks.      Records requested.  Followup: Return in about 4 weeks (around 4/27/2021) for Blood work and BP.    I have placed the above orders and the MA is performing these under the direction of Dr. Farley    Please note that this dictation was created using voice recognition software. I have made every reasonable attempt to correct obvious errors, but I expect that there are errors of grammar and possibly content that I did not discover before finalizing the note.

## 2021-05-01 ENCOUNTER — HOSPITAL ENCOUNTER (OUTPATIENT)
Dept: LAB | Facility: MEDICAL CENTER | Age: 26
End: 2021-05-01
Attending: FAMILY MEDICINE
Payer: COMMERCIAL

## 2021-05-01 DIAGNOSIS — Z13.6 ENCOUNTER FOR LIPID SCREENING FOR CARDIOVASCULAR DISEASE: ICD-10-CM

## 2021-05-01 DIAGNOSIS — Z13.220 ENCOUNTER FOR LIPID SCREENING FOR CARDIOVASCULAR DISEASE: ICD-10-CM

## 2021-05-01 DIAGNOSIS — Z13.21 ENCOUNTER FOR VITAMIN DEFICIENCY SCREENING: ICD-10-CM

## 2021-05-01 DIAGNOSIS — Z13.1 SCREENING FOR DIABETES MELLITUS: ICD-10-CM

## 2021-05-01 LAB
CHOLEST SERPL-MCNC: 130 MG/DL (ref 100–199)
EST. AVERAGE GLUCOSE BLD GHB EST-MCNC: 105 MG/DL
FASTING STATUS PATIENT QL REPORTED: NORMAL
HBA1C MFR BLD: 5.3 % (ref 4–5.6)
HDLC SERPL-MCNC: 32 MG/DL
LDLC SERPL CALC-MCNC: 88 MG/DL
TRIGL SERPL-MCNC: 49 MG/DL (ref 0–149)

## 2021-05-01 PROCEDURE — 82306 VITAMIN D 25 HYDROXY: CPT

## 2021-05-01 PROCEDURE — 80061 LIPID PANEL: CPT

## 2021-05-01 PROCEDURE — 83036 HEMOGLOBIN GLYCOSYLATED A1C: CPT

## 2021-05-01 PROCEDURE — 36415 COLL VENOUS BLD VENIPUNCTURE: CPT

## 2021-05-04 LAB — 25(OH)D3 SERPL-MCNC: 14 NG/ML (ref 30–80)

## 2021-05-05 ENCOUNTER — OFFICE VISIT (OUTPATIENT)
Dept: MEDICAL GROUP | Facility: MEDICAL CENTER | Age: 26
End: 2021-05-05
Payer: COMMERCIAL

## 2021-05-05 VITALS
DIASTOLIC BLOOD PRESSURE: 68 MMHG | WEIGHT: 235.89 LBS | HEIGHT: 71 IN | SYSTOLIC BLOOD PRESSURE: 102 MMHG | HEART RATE: 81 BPM | OXYGEN SATURATION: 97 % | BODY MASS INDEX: 33.02 KG/M2 | RESPIRATION RATE: 17 BRPM | TEMPERATURE: 98.1 F

## 2021-05-05 DIAGNOSIS — R03.0 ELEVATED BLOOD PRESSURE READING IN OFFICE WITHOUT DIAGNOSIS OF HYPERTENSION: ICD-10-CM

## 2021-05-05 DIAGNOSIS — E55.9 VITAMIN D INSUFFICIENCY: ICD-10-CM

## 2021-05-05 DIAGNOSIS — E66.9 OBESITY (BMI 30-39.9): ICD-10-CM

## 2021-05-05 PROCEDURE — 99212 OFFICE O/P EST SF 10 MIN: CPT | Performed by: FAMILY MEDICINE

## 2021-05-05 RX ORDER — MULTIVIT-MIN/IRON/FOLIC ACID/K 18-600-40
CAPSULE ORAL
COMMUNITY

## 2021-05-05 ASSESSMENT — ENCOUNTER SYMPTOMS
PALPITATIONS: 0
DIARRHEA: 0
BRUISES/BLEEDS EASILY: 0
FEVER: 0
DIZZINESS: 0
COUGH: 0
WEAKNESS: 0
SHORTNESS OF BREATH: 0
BLURRED VISION: 0
DEPRESSION: 0
DOUBLE VISION: 0
WEIGHT LOSS: 0
MYALGIAS: 0
CHILLS: 0
NAUSEA: 0
CONSTIPATION: 0

## 2021-05-05 ASSESSMENT — FIBROSIS 4 INDEX: FIB4 SCORE: 0.36

## 2021-05-05 NOTE — PROGRESS NOTES
Ryan Washington is a pleasant 25 y.o. male here for lab review.    HPI:    Obesity (BMI 30-39.9)  Patient reports that he has been making substantial improvements to his diet by increasing his leafy greens and cutting back high fat high carb foods.  He has been going to the gym frequently and plays softball on Mondays.  He states that he has a very positive outlook and wants to continue making healthy changes for himself.  He states that he has already started to notice improvements in his outlook.    Elevated blood pressure reading in office without diagnosis of hypertension  Patient's blood pressure in clinic is 102/68, this has been an improvement since his previous visit.       Vitamin D insufficiency  Patient recently had blood work completed which showed of low vitamin D level of 14.  Patient has been taking a daily vitamin D supplementation to help correct this.        Current Medicines (including changes today)  Current Outpatient Medications   Medication Sig Dispense Refill   • Cholecalciferol (VITAMIN D) 50 MCG (2000 UT) Cap Take  by mouth.     • Fluticasone Propionate (FLONASE ALLERGY RELIEF NA) Administer  into affected nostril(S).     • Psyllium (METAMUCIL PO) Take  by mouth.     • albuterol 108 (90 Base) MCG/ACT Aero Soln inhalation aerosol Inhale 2 Puffs every 6 hours as needed for Shortness of Breath. 8.5 g 4   • mometasone (ASMANEX, 30 METERED DOSES,) 220 MCG/INH inhaler Inhale 1 Puff every bedtime. 3 Each 1   • montelukast (SINGULAIR) 10 MG Tab Take 1 tablet by mouth every evening. 30 tablet 2     No current facility-administered medications for this visit.     Past Medical/ Surgical History  He  has a past medical history of Diverticulitis and Unspecified asthma(493.90).  He  has no past surgical history on file.    Review of Systems   Constitutional: Negative for chills, fever and weight loss.   HENT: Negative for hearing loss.    Eyes: Negative for blurred vision and double vision.  "  Respiratory: Negative for cough and shortness of breath.    Cardiovascular: Negative for chest pain, palpitations and leg swelling.   Gastrointestinal: Negative for constipation, diarrhea and nausea.   Genitourinary: Negative.    Musculoskeletal: Negative for myalgias.   Skin: Negative for rash.   Neurological: Negative for dizziness and weakness.   Endo/Heme/Allergies: Does not bruise/bleed easily.   Psychiatric/Behavioral: Negative for depression.         Objective:     /68 (BP Location: Left arm, Patient Position: Sitting, BP Cuff Size: Adult)   Pulse 81   Temp 36.7 °C (98.1 °F)   Resp 17   Ht 1.803 m (5' 11\")   Wt 107 kg (235 lb 14.3 oz)   SpO2 97%  Body mass index is 32.9 kg/m².    Physical Exam   Constitutional: He is oriented to person, place, and time and well-developed, well-nourished, and in no distress. No distress.   HENT:   Head: Normocephalic and atraumatic.   Eyes: Pupils are equal, round, and reactive to light. Conjunctivae are normal.   Pulmonary/Chest: Effort normal. No respiratory distress.   Abdominal: He exhibits no distension.   Musculoskeletal:      Cervical back: Normal range of motion and neck supple.   Neurological: He is alert and oriented to person, place, and time. Gait normal.   Skin: Skin is warm and dry. No rash noted.   Psychiatric: Affect normal.      Imaging:  No imaging to review    Labs  Recent labs from 05/01/2021 reviewed with the patient.    Results for AYAKA LOVE (MRN [id number]) as of 5/5/2021 16:47   Ref. Range 5/1/2021 08:22   Glycohemoglobin Latest Ref Range: 4.0 - 5.6 % 5.3   Estim. Avg Glu Latest Units: mg/dL 105   Cholesterol,Tot Latest Ref Range: 100 - 199 mg/dL 130   Triglycerides Latest Ref Range: 0 - 149 mg/dL 49   HDL Latest Ref Range: >=40 mg/dL 32 (A)   LDL Latest Ref Range: <100 mg/dL 88   25-Hydroxy   Vitamin D 25 Latest Ref Range: 30 - 80 ng/mL 14 (L)     Assessment and Plan:   The following treatment plan was discussed    1. Vitamin D " insufficiency  New to the patient.  I recommended that he take vitamin D supplementation up to 2000 units daily.  We will go ahead and repeat a vitamin D in 6 months.  - VITAMIN D,25 HYDROXY; Future    2. Obesity (BMI 30-39.9)  Chronic, improving.  Patient sounding very encouraging regarding his weight and increasing physical activity.  I encouraged that he continue with the changes that he has made.    3. Elevated blood pressure reading in office without diagnosis of hypertension  Patient's blood pressure is normal in office.      Records requested.  Followup: Return in about 6 months (around 11/5/2021) for blood work.    Please note that this dictation was created using voice recognition software. I have made every reasonable attempt to correct obvious errors, but I expect that there are errors of grammar and possibly content that I did not discover before finalizing the note.

## 2021-05-05 NOTE — ASSESSMENT & PLAN NOTE
Patient recently had blood work completed which showed of low vitamin D level of 14.  Patient has been taking a daily vitamin D supplementation to help correct this.

## 2021-05-05 NOTE — ASSESSMENT & PLAN NOTE
Patient reports that he has been making substantial improvements to his diet by increasing his leafy greens and cutting back high fat high carb foods.  He has been going to the gym frequently and plays softball on Mondays.  He states that he has a very positive outlook and wants to continue making healthy changes for himself.  He states that he has already started to notice improvements in his outlook.   n/a

## 2021-05-05 NOTE — ASSESSMENT & PLAN NOTE
Patient's blood pressure in clinic is 102/68, this has been an improvement since his previous visit.

## 2021-05-27 DIAGNOSIS — J45.30 MILD PERSISTENT ASTHMA WITHOUT COMPLICATION: ICD-10-CM

## 2021-05-28 RX ORDER — MONTELUKAST SODIUM 10 MG/1
TABLET ORAL
Qty: 30 TABLET | Refills: 2 | Status: SHIPPED | OUTPATIENT
Start: 2021-05-28 | End: 2021-08-26

## 2021-06-01 DIAGNOSIS — J45.30 MILD PERSISTENT ASTHMA WITHOUT COMPLICATION: ICD-10-CM

## 2021-06-01 NOTE — TELEPHONE ENCOUNTER
Received request via: Pharmacy    Was the patient seen in the last year in this department? Yes    Does the patient have an active prescription (recently filled or refills available) for medication(s) requested? No     Requested Prescriptions     Pending Prescriptions Disp Refills   • ASMANEX, 30 METERED DOSES, 220 MCG/INH inhaler [Pharmacy Med Name: ASMANEX TWISTHALER 220 MCG #30]  5     Sig: TAKE 1 PUFF BY MOUTH EVERY DAY AT BEDTIME

## 2021-06-19 ENCOUNTER — HOSPITAL ENCOUNTER (EMERGENCY)
Facility: MEDICAL CENTER | Age: 26
End: 2021-06-19
Attending: EMERGENCY MEDICINE
Payer: COMMERCIAL

## 2021-06-19 VITALS
WEIGHT: 233.69 LBS | BODY MASS INDEX: 32.72 KG/M2 | DIASTOLIC BLOOD PRESSURE: 86 MMHG | TEMPERATURE: 97.4 F | OXYGEN SATURATION: 97 % | HEART RATE: 69 BPM | RESPIRATION RATE: 18 BRPM | HEIGHT: 71 IN | SYSTOLIC BLOOD PRESSURE: 136 MMHG

## 2021-06-19 DIAGNOSIS — L02.91 ABSCESS: ICD-10-CM

## 2021-06-19 LAB
GRAM STN SPEC: NORMAL
SIGNIFICANT IND 70042: NORMAL
SITE SITE: NORMAL
SOURCE SOURCE: NORMAL

## 2021-06-19 PROCEDURE — 87070 CULTURE OTHR SPECIMN AEROBIC: CPT

## 2021-06-19 PROCEDURE — 87205 SMEAR GRAM STAIN: CPT

## 2021-06-19 PROCEDURE — 99283 EMERGENCY DEPT VISIT LOW MDM: CPT

## 2021-06-19 PROCEDURE — 87186 SC STD MICRODIL/AGAR DIL: CPT

## 2021-06-19 PROCEDURE — A9270 NON-COVERED ITEM OR SERVICE: HCPCS | Performed by: EMERGENCY MEDICINE

## 2021-06-19 PROCEDURE — 700102 HCHG RX REV CODE 250 W/ 637 OVERRIDE(OP): Performed by: EMERGENCY MEDICINE

## 2021-06-19 PROCEDURE — 303977 HCHG I & D

## 2021-06-19 PROCEDURE — 87077 CULTURE AEROBIC IDENTIFY: CPT

## 2021-06-19 PROCEDURE — 700101 HCHG RX REV CODE 250: Performed by: EMERGENCY MEDICINE

## 2021-06-19 PROCEDURE — 87147 CULTURE TYPE IMMUNOLOGIC: CPT

## 2021-06-19 RX ORDER — SULFAMETHOXAZOLE AND TRIMETHOPRIM 800; 160 MG/1; MG/1
1 TABLET ORAL 2 TIMES DAILY
Qty: 20 TABLET | Refills: 0 | Status: SHIPPED | OUTPATIENT
Start: 2021-06-19 | End: 2021-06-29

## 2021-06-19 RX ORDER — SULFAMETHOXAZOLE AND TRIMETHOPRIM 800; 160 MG/1; MG/1
1 TABLET ORAL ONCE
Status: COMPLETED | OUTPATIENT
Start: 2021-06-19 | End: 2021-06-19

## 2021-06-19 RX ORDER — LIDOCAINE HYDROCHLORIDE AND EPINEPHRINE 10; 10 MG/ML; UG/ML
20 INJECTION, SOLUTION INFILTRATION; PERINEURAL ONCE
Status: COMPLETED | OUTPATIENT
Start: 2021-06-19 | End: 2021-06-19

## 2021-06-19 RX ORDER — AMOXICILLIN AND CLAVULANATE POTASSIUM 875; 125 MG/1; MG/1
1 TABLET, FILM COATED ORAL 2 TIMES DAILY
Qty: 20 TABLET | Refills: 0 | Status: SHIPPED | OUTPATIENT
Start: 2021-06-19 | End: 2021-06-29

## 2021-06-19 RX ORDER — AMOXICILLIN AND CLAVULANATE POTASSIUM 875; 125 MG/1; MG/1
1 TABLET, FILM COATED ORAL ONCE
Status: COMPLETED | OUTPATIENT
Start: 2021-06-19 | End: 2021-06-19

## 2021-06-19 RX ADMIN — LIDOCAINE HYDROCHLORIDE AND EPINEPHRINE 20 ML: 10; 10 INJECTION, SOLUTION INFILTRATION; PERINEURAL at 12:00

## 2021-06-19 RX ADMIN — AMOXICILLIN AND CLAVULANATE POTASSIUM 1 TABLET: 875; 125 TABLET, FILM COATED ORAL at 11:47

## 2021-06-19 RX ADMIN — SULFAMETHOXAZOLE AND TRIMETHOPRIM 1 TABLET: 800; 160 TABLET ORAL at 11:47

## 2021-06-19 ASSESSMENT — FIBROSIS 4 INDEX: FIB4 SCORE: 0.36

## 2021-06-19 NOTE — ED NOTES
Dc instructions reviewed with pt. Aware of need to  meds at [facility] [place] , chg dressing t least daily, f/u with pcp and return for dressing /packing removal in 2 days

## 2021-06-19 NOTE — LETTER
6/21/2021               Ryan Maurisio Padmini  [address] [place] [address]        Dear Ryan (MR#[id number])    This letter is sent in regards to your recent visit to the [facility] Emergency Department on 6/19/2021. During the visit, tests were performed to assist the physician in your medical diagnosis. A review of your tests requires that we notify you of the following:    Your wound culture was POSITIVE for a bacteria called Methicillin Resistant Staphylococcus aureus (MRSA). The antibiotic prescribed for you (sulfamethoxazole-trimethoprim and amoxicillin-clavulanate) should be active to treat this bacteria. It is important that you continue taking your antibiotic until it is finished.     Please feel free to contact me at the number below if you have any questions or concerns. Thank you for your cooperation in the matter.    Sincerely,  ED Culture Follow-Up Staff  Arthur Martinez, PharmD    [facility], Emergency Department  [address] [place] [phone] (ED Culture Line)

## 2021-06-19 NOTE — ED PROVIDER NOTES
ED Provider Note    CHIEF COMPLAINT  Chief Complaint   Patient presents with   • Abscess       HPI  Ryan Washington is a 25 y.o. male who presents to the emergency department complaining of a boil in the left axillary area.  This is been going on for about 2 weeks the area has been, red and swollen and painful.  Several weeks ago the patient had a similar process which spontaneously ruptured and drained and he did not seek medical attention at that time.    REVIEW OF SYSTEMS no fever or chills he does not recognize any specific exacerbating or alleviating factors or precipitating events    PAST MEDICAL HISTORY  Past Medical History:   Diagnosis Date   • Diverticulitis     dx in 2020 with hospital admission   • Unspecified asthma(493.90)        FAMILY HISTORY  Family History   Problem Relation Age of Onset   • Diabetes Mother    • No Known Problems Father    • Diabetes Maternal Grandmother    • Diabetes Maternal Grandfather    • Diabetes Paternal Grandfather    • Other Brother         diverticulitis   • Sleep Apnea Brother        SOCIAL HISTORY  Social History     Socioeconomic History   • Marital status: Single     Spouse name: Not on file   • Number of children: Not on file   • Years of education: Not on file   • Highest education level: 12th grade   Occupational History   • Not on file   Tobacco Use   • Smoking status: Never Smoker   • Smokeless tobacco: Former User   Vaping Use   • Vaping Use: Never used   Substance and Sexual Activity   • Alcohol use: Not Currently   • Drug use: Yes     Types: Marijuana, Inhaled     Comment: Marijuana   • Sexual activity: Not Currently     Partners: Female   Other Topics Concern   • Not on file   Social History Narrative   • Not on file     Social Determinants of Health     Financial Resource Strain: Low Risk    • Difficulty of Paying Living Expenses: Not hard at all   Food Insecurity: No Food Insecurity   • Worried About Running Out of Food in the Last Year: Never true  "  • Ran Out of Food in the Last Year: Never true   Transportation Needs: No Transportation Needs   • Lack of Transportation (Medical): No   • Lack of Transportation (Non-Medical): No   Physical Activity: Sufficiently Active   • Days of Exercise per Week: 3 days   • Minutes of Exercise per Session: 60 min   Stress: Unknown   • Feeling of Stress : Patient refused   Social Connections: Socially Isolated   • Frequency of Communication with Friends and Family: Never   • Frequency of Social Gatherings with Friends and Family: Twice a week   • Attends Holiness Services: Never   • Active Member of Clubs or Organizations: No   • Attends Club or Organization Meetings: Never   • Marital Status: Never    Intimate Partner Violence:    • Fear of Current or Ex-Partner:    • Emotionally Abused:    • Physically Abused:    • Sexually Abused:        SURGICAL HISTORY  History reviewed. No pertinent surgical history.    CURRENT MEDICATIONS  Home Medications    **Home medications have not yet been reviewed for this encounter**         ALLERGIES  No Known Allergies    PHYSICAL EXAM  VITAL SIGNS: /86   Pulse 69   Temp 36.3 °C (97.4 °F) (Temporal)   Resp 18   Ht 1.803 m (5' 11\")   Wt 106 kg (233 lb 11 oz)   SpO2 97%   BMI 32.59 kg/m²    Oxygen saturation is interpreted as adequate  Constitutional: Awake lucid verbal pleasant individual  Cardiovascular: Regular rate and rhythm  Lungs: Clear and equal bilaterally with no apparent difficulty breathing  Skin: In the left axillary area there is a golf ball sized area of swelling and fluctuance with a central pustule.  Findings are consistent with an axillary abscess.  Musculoskeletal: No acute bony deformity  Neurologic: Awake lucid verbal ambulatory    Laboratory  Wound culture was sent to the laboratory    PROCEDURES  Procedure was discussed with the patient and verbal consent obtained.  The area was locally infiltrated with lidocaine and epinephrine to achieve adequate " anesthesia.  Using sterile instruments I then made a 1 cm incision with the scalpel and drained a large amount of pus from the abscess cavity.  Culture was sent to the lab.  The abscess cavity was packed with sterile gauze packing and a bandage placed by nursing staff.    MEDICAL DECISION MAKING and DISPOSITION  Also in the emergency department the patient was started on Augmentin and Bactrim.  His tetanus booster was last updated in 2018.  At this point in time I think it is safe for him to go home I have offered prescription pain medications but he feels he will be okay with Tylenol and Motrin.  I have written prescriptions for a 10-day course of Bactrim and Augmentin.  I have advised the patient to return here in 2 days for wound check and packing removal.  If he has a high fever or feels he is having new or worsening symptoms he is to return immediately for recheck    IMPRESSION  1.  I&D of left axillary abscess with packing         Electronically signed by: Edwin Maguire M.D., 6/19/2021 12:18 PM

## 2021-06-19 NOTE — ED NOTES
erp to bedside for wound care-site I and d by same with packing and dressing by this rn. Pt for d/c after same

## 2021-06-19 NOTE — DISCHARGE INSTRUCTIONS
Use Tylenol and Motrin if needed for discomfort.  If you develop a high fever or feel sick or have new or worsening symptoms return immediately otherwise return here in 2 days for wound check and packing removal

## 2021-06-19 NOTE — ED TRIAGE NOTES
"Presents complaining of a left axillary painful mass (\"possible abscess\"), developing for the past 2 weeks.   Chief Complaint   Patient presents with   • Abscess   /72   Pulse 76   Temp 36.3 °C (97.4 °F) (Temporal)   Resp 18   Ht 1.803 m (5' 11\")   Wt 106 kg (233 lb 11 oz)   SpO2 98%   BMI 32.59 kg/m²           "

## 2021-06-21 ENCOUNTER — HOSPITAL ENCOUNTER (EMERGENCY)
Facility: MEDICAL CENTER | Age: 26
End: 2021-06-21
Payer: COMMERCIAL

## 2021-06-21 VITALS
SYSTOLIC BLOOD PRESSURE: 120 MMHG | WEIGHT: 233.69 LBS | DIASTOLIC BLOOD PRESSURE: 68 MMHG | HEART RATE: 70 BPM | TEMPERATURE: 97.6 F | BODY MASS INDEX: 32.72 KG/M2 | HEIGHT: 71 IN | OXYGEN SATURATION: 98 % | RESPIRATION RATE: 20 BRPM

## 2021-06-21 LAB
BACTERIA WND AEROBE CULT: ABNORMAL
BACTERIA WND AEROBE CULT: ABNORMAL
GRAM STN SPEC: ABNORMAL
SIGNIFICANT IND 70042: ABNORMAL
SITE SITE: ABNORMAL
SOURCE SOURCE: ABNORMAL

## 2021-06-21 PROCEDURE — 302449 STATCHG TRIAGE ONLY (STATISTIC)

## 2021-06-21 ASSESSMENT — FIBROSIS 4 INDEX: FIB4 SCORE: 0.36

## 2021-06-21 NOTE — ED TRIAGE NOTES
"Pt was treated for a left axillary abscess with packing approximately 2 days.  He returns today for removal, and denies any complications.    Chief Complaint   Patient presents with   • Wound Re-Check   • Packing Removal   /68   Pulse 70   Temp 36.4 °C (97.6 °F) (Temporal)   Resp 20   Ht 1.803 m (5' 11\")   Wt 106 kg (233 lb 11 oz)   SpO2 98%   BMI 32.59 kg/m²         "

## 2021-06-21 NOTE — ED NOTES
"ED Positive Culture Follow-up/Notification Note:    Date: 6/21/2021     Patient seen in the ED on 6/19/2021 for abscess.   1. Abscess       Discharge Medication List as of 6/19/2021 12:10 PM      START taking these medications    Details   amoxicillin-clavulanate (AUGMENTIN) 875-125 MG Tab Take 1 tablet by mouth 2 times a day for 10 days., Disp-20 tablet, R-0, Normal      sulfamethoxazole-trimethoprim (BACTRIM DS) 800-160 MG tablet Take 1 tablet by mouth 2 times a day for 10 days., Disp-20 tablet, R-0, Normal           Augmentin 875 mg and Bactrim DS PO x1 given in ER     Allergies: Patient has no known allergies.     Vitals:    06/19/21 1053 06/19/21 1055 06/19/21 1205   BP:  117/72 136/86   Pulse:  76 69   Resp:  18    Temp:  36.3 °C (97.4 °F)    TempSrc:  Temporal    SpO2:  98% 97%   Weight: 106 kg (233 lb 11 oz)     Height: 1.803 m (5' 11\")         Final cultures:   Results     Procedure Component Value Units Date/Time    CULTURE WOUND W/ GRAM STAIN [[id number]]  (Abnormal)  (Susceptibility) Collected: 06/19/21 1204    Order Status: Completed Specimen: Wound from Abscess Updated: 06/21/21 0805     Significant Indicator POS     Source WND     Site Left Axillary Abscess     Culture Result -     Gram Stain Result Few WBCs.  Few Gram positive cocci.       Culture Result Methicillin Resistant Staphylococcus aureus  Moderate growth      Susceptibility     Methicillin resistant staphylococcus aureus (1)     Antibiotic Interpretation Microscan Method Status    Ampicillin  [*]  Resistant >8 mcg/mL VERONICA Final    Amoxicillin/CA  [*]  Resistant >4/2 mcg/mL VERONICA Final    Azithromycin Resistant >4 mcg/mL VERONICA Final    Ceftriaxone  [*]  Resistant 32 mcg/mL VERONICA Final    Clindamycin Sensitive 0.5 mcg/mL VERONICA Final    Cephalothin  [*]  Resistant <=8 mcg/mL VERONICA Final    Cefoxitin Screen  [*]  Positive >4 mcg/mL VERONICA Final    Cefazolin Resistant <=8 mcg/mL VERONICA Final    Ciprofloxacin  [*]  Resistant >2 mcg/mL VERONICA Final    Cefepime " Resistant >16 mcg/mL VERONICA Final    Ceftaroline Sensitive <=0.5 mcg/mL VERONICA Final    Daptomycin Sensitive 1 mcg/mL VERONICA Final    Ampicillin/sulbactam Resistant 16/8 mcg/mL VERONICA Final    Erythromycin Resistant >4 mcg/mL VERONICA Final    Vancomycin Sensitive 1 mcg/mL VERONICA Final    Gentamicin  [*]  Sensitive <=4 mcg/mL VERONICA Final    Inducible Clindamycin Test  [*]  Negative <=4/0.5 mcg/mL VERONICA Final    Imipenem  [*]  Resistant <=4 mcg/mL VERONICA Final    Levofloxacin  [*]  Resistant >4 mcg/mL VERONICA Final    Linezolid  [*]  Sensitive 2 mcg/mL VERONICA Final    Meropenem  [*]  Resistant <=2 mcg/mL VERONICA Final    Oxacillin Resistant >2 mcg/mL VERONICA Final    Rifampin  [*]  Sensitive <=1 mcg/mL VERONICA Final    Synercid  [*]  Sensitive <=0.5 mcg/mL VERONICA Final    Trimeth/Sulfa Sensitive <=0.5/9.5 mcg/mL VERONICA Final    Tetracycline Sensitive <=4 mcg/mL VERONICA Final    Tigecycline  [*]  Sensitive <=0.25 mcg/mL VERONICA Final           [*]  Suppressed Antibiotic                 GRAM STAIN [[id number]] Collected: 06/19/21 1204    Order Status: Completed Specimen: Wound Updated: 06/19/21 1804     Significant Indicator .     Source WND     Site Left Axillary Abscess     Gram Stain Result Few WBCs.  Few Gram positive cocci.            Plan:   Bactrim will cover MRSA in wound, given redness around wound will also continue Augmentin to provide strep coverage.   Appropriate antibiotic therapy prescribed. No changes required based upon culture result.  Sent letter to patient to notify of positive culture result and encourage compliance with prescribed antibiotics.     Arthur Martinez, PharmD

## 2021-06-21 NOTE — ED NOTES
Pt instructed on wound care and return precautions, denied questions/concerns.  Pt thanked ERP and RN for care.  Pt dismissed.

## 2021-08-26 DIAGNOSIS — J45.30 MILD PERSISTENT ASTHMA WITHOUT COMPLICATION: ICD-10-CM

## 2021-08-26 RX ORDER — MONTELUKAST SODIUM 10 MG/1
TABLET ORAL
Qty: 90 TABLET | Refills: 3 | Status: SHIPPED | OUTPATIENT
Start: 2021-08-26 | End: 2022-09-06

## 2021-08-26 NOTE — TELEPHONE ENCOUNTER
Received request via: Pharmacy    Was the patient seen in the last year in this department? Yes    Does the patient have an active prescription (recently filled or refills available) for medication(s) requested? No    Requested Prescriptions     Pending Prescriptions Disp Refills   • montelukast (SINGULAIR) 10 MG Tab [Pharmacy Med Name: MONTELUKAST SOD 10 MG TABLET] 90 Tablet      Sig: TAKE 1 TABLET BY MOUTH EVERY DAY IN THE EVENING

## 2021-10-21 ENCOUNTER — OFFICE VISIT (OUTPATIENT)
Dept: MEDICAL GROUP | Facility: MEDICAL CENTER | Age: 26
End: 2021-10-21
Payer: COMMERCIAL

## 2021-10-21 VITALS
OXYGEN SATURATION: 95 % | BODY MASS INDEX: 31.42 KG/M2 | DIASTOLIC BLOOD PRESSURE: 68 MMHG | WEIGHT: 224.43 LBS | TEMPERATURE: 98 F | SYSTOLIC BLOOD PRESSURE: 110 MMHG | HEIGHT: 71 IN | HEART RATE: 78 BPM

## 2021-10-21 DIAGNOSIS — Z00.00 WELLNESS EXAMINATION: ICD-10-CM

## 2021-10-21 DIAGNOSIS — Z23 IMMUNIZATION DUE: ICD-10-CM

## 2021-10-21 DIAGNOSIS — J45.30 MILD PERSISTENT ASTHMA WITHOUT COMPLICATION: ICD-10-CM

## 2021-10-21 PROBLEM — R03.0 ELEVATED BLOOD PRESSURE READING IN OFFICE WITHOUT DIAGNOSIS OF HYPERTENSION: Status: RESOLVED | Noted: 2021-03-30 | Resolved: 2021-10-21

## 2021-10-21 PROCEDURE — 90686 IIV4 VACC NO PRSV 0.5 ML IM: CPT | Performed by: FAMILY MEDICINE

## 2021-10-21 PROCEDURE — 90471 IMMUNIZATION ADMIN: CPT | Performed by: FAMILY MEDICINE

## 2021-10-21 PROCEDURE — 99395 PREV VISIT EST AGE 18-39: CPT | Mod: 25 | Performed by: FAMILY MEDICINE

## 2021-10-21 ASSESSMENT — ENCOUNTER SYMPTOMS
DIZZINESS: 0
DEPRESSION: 0
TINGLING: 0
COUGH: 0
WEAKNESS: 0
CONSTIPATION: 0
SHORTNESS OF BREATH: 0
NERVOUS/ANXIOUS: 0
DIARRHEA: 0
BRUISES/BLEEDS EASILY: 0
NAUSEA: 0
MYALGIAS: 0
ABDOMINAL PAIN: 0
SORE THROAT: 0
PALPITATIONS: 0
CHILLS: 0
FEVER: 0
DOUBLE VISION: 0
WEIGHT LOSS: 0
VOMITING: 0
BLURRED VISION: 0

## 2021-10-21 ASSESSMENT — FIBROSIS 4 INDEX: FIB4 SCORE: 0.38

## 2021-10-21 NOTE — ASSESSMENT & PLAN NOTE
Patient reports that he only needs to use albuterol during fire season.  He keeps his inhaler with him and knows when he needs to use it.  He has not needed to use it since the summer.

## 2021-10-21 NOTE — PROGRESS NOTES
cc: well exam    Subjective:     Ryan Washington is a pleasant 26 y.o. male presents for a routine preventive health exam.    Mild persistent asthma without complication  Patient reports that he only needs to use albuterol during fire season.  He keeps his inhaler with him and knows when he needs to use it.  He has not needed to use it since the summer.    Health Maintenance  Advanced directive: N/A  Osteoporosis Screen/ DEXA: n/a   PT/vit D for falls prevention: n/a   Cholesterol Screening: Completed 05/01/2021, normal   Diabetes Screening: Completed 05/01/2021, Normal  AAA Screening: n/a   Aspirin Use: N/A  Diet: improved dieting, increase lean proteins and increase in fruits and veggies   Exercise: increase physical activity, noticeable weight loss  Substance Abuse: No concerns  Safe in relationship.  Seat belts, bike helmet, gun safety discussed.  Sun protection used.    Cancer screening  Colorectal Cancer Screening: No family hx   Lung Cancer Screening: Non-smoker   Cervical Cancer Screening: N/A   Breast Cancer Screening: No family hx  Prostate Cancer Screening/PSA: No family hx    Infectious disease screening/Immunizations  --STI Screening: No concerns  --Practices safe sex.  --HIV Screening: No concerns  --Hepatitis C Screening: N/A  --Immunizations:    Influenza: due    HPV:  None   Tetanus: Due 10/2028    Shingles: n/a    Pneumococcal : No risk factors     Other immunizations: Up to date with COVID vaccine     Review of Systems   Constitutional: Negative for chills, fever, malaise/fatigue and weight loss.   HENT: Negative for hearing loss and sore throat.    Eyes: Negative for blurred vision and double vision.   Respiratory: Negative for cough and shortness of breath.    Cardiovascular: Negative for chest pain, palpitations and leg swelling.   Gastrointestinal: Negative for abdominal pain, constipation, diarrhea, nausea and vomiting.   Musculoskeletal: Negative for myalgias.   Skin: Negative for  rash.   Neurological: Negative for dizziness, tingling and weakness.   Endo/Heme/Allergies: Does not bruise/bleed easily.   Psychiatric/Behavioral: Negative for depression. The patient is not nervous/anxious.           Current Outpatient Medications:   •  montelukast (SINGULAIR) 10 MG Tab, TAKE 1 TABLET BY MOUTH EVERY DAY IN THE EVENING, Disp: 90 Tablet, Rfl: 3  •  Cholecalciferol (VITAMIN D) 50 MCG (2000 UT) Cap, Take  by mouth., Disp: , Rfl:   •  Fluticasone Propionate (FLONASE ALLERGY RELIEF NA), Administer  into affected nostril(S)., Disp: , Rfl:   •  Psyllium (METAMUCIL PO), Take  by mouth., Disp: , Rfl:   •  albuterol 108 (90 Base) MCG/ACT Aero Soln inhalation aerosol, Inhale 2 Puffs every 6 hours as needed for Shortness of Breath., Disp: 8.5 g, Rfl: 4    No Known Allergies    Past Medical History:   Diagnosis Date   • Diverticulitis     dx in 2020 with hospital admission   • Unspecified asthma(493.90)      History reviewed. No pertinent surgical history.  Family History   Problem Relation Age of Onset   • Diabetes Mother    • No Known Problems Father    • Diabetes Maternal Grandmother    • Diabetes Maternal Grandfather    • Diabetes Paternal Grandfather    • Other Brother         diverticulitis   • Sleep Apnea Brother      Social History     Socioeconomic History   • Marital status: Single     Spouse name: Not on file   • Number of children: Not on file   • Years of education: Not on file   • Highest education level: 12th grade   Occupational History   • Not on file   Tobacco Use   • Smoking status: Never Smoker   • Smokeless tobacco: Former User   Vaping Use   • Vaping Use: Never used   Substance and Sexual Activity   • Alcohol use: Not Currently   • Drug use: Yes     Types: Marijuana, Inhaled     Comment: Marijuana   • Sexual activity: Not Currently     Partners: Female   Other Topics Concern   • Not on file   Social History Narrative   • Not on file     Social Determinants of Health     Financial Resource  "Strain: Low Risk    • Difficulty of Paying Living Expenses: Not hard at all   Food Insecurity: No Food Insecurity   • Worried About Running Out of Food in the Last Year: Never true   • Ran Out of Food in the Last Year: Never true   Transportation Needs: No Transportation Needs   • Lack of Transportation (Medical): No   • Lack of Transportation (Non-Medical): No   Physical Activity: Sufficiently Active   • Days of Exercise per Week: 3 days   • Minutes of Exercise per Session: 60 min   Stress: Unknown   • Feeling of Stress : Patient refused   Social Connections: Socially Isolated   • Frequency of Communication with Friends and Family: Never   • Frequency of Social Gatherings with Friends and Family: Twice a week   • Attends [employer] Services: Never   • Active Member of Clubs or Organizations: No   • Attends Club or Organization Meetings: Never   • Marital Status: Never    Intimate Partner Violence:    • Fear of Current or Ex-Partner:    • Emotionally Abused:    • Physically Abused:    • Sexually Abused:         Objective:     /68 (BP Location: Right arm, Patient Position: Sitting, BP Cuff Size: Adult)   Pulse 78   Temp 36.7 °C (98 °F)   Ht 1.803 m (5' 11\")   Wt 102 kg (224 lb 6.9 oz)   SpO2 95%  Body mass index is 31.3 kg/m².    Physical Exam  Constitutional:       General: He is not in acute distress.  HENT:      Head: Normocephalic and atraumatic.      Right Ear: External ear normal.      Left Ear: External ear normal.   Eyes:      Conjunctiva/sclera: Conjunctivae normal.      Pupils: Pupils are equal, round, and reactive to light.   Cardiovascular:      Rate and Rhythm: Normal rate and regular rhythm.      Heart sounds: No murmur heard.   No friction rub. No gallop.    Pulmonary:      Effort: Pulmonary effort is normal.      Breath sounds: Normal breath sounds. No wheezing or rales.   Abdominal:      General: Bowel sounds are normal.      Palpations: Abdomen is soft.      Tenderness: There is no " abdominal tenderness.   Musculoskeletal:      Cervical back: Normal range of motion and neck supple.   Skin:     General: Skin is warm and dry.      Findings: No rash.   Neurological:      Mental Status: He is alert and oriented to person, place, and time.      Gait: Gait is intact.   Psychiatric:         Mood and Affect: Affect normal.        Imaging:  No recent imaging to review    Labs:   No recent labs to review    Assessment/Plan:     1. Wellness examination  Patient Counseling:  --Discussed moderation in sodium/caffeine intake, saturated fat and cholesterol, caloric balance, sufficient fresh fruits/vegetables, fiber, iron, and 0.4-0.8mg of folate supplement per day (for females capable of pregnancy).  --Discussed brushing, flossing, and dental visits.   --Encouraged regular exercise.   --Discussed tobacco, alcohol, or other drug use; availability of treatment for abuse.   --Discussed sexually transmitted infections, partner selection, use of condoms, avoidance of unintended pregnancy and contraceptive alternatives.  --Injury prevention: Discussed safety belts, safety helmets, smoke detector, etc.    2. Mild persistent asthma without complication  Chronic, stable.  I recommended that the patient keep the albuterol inhaler on his person in the event that he does need to use his rescue inhaler.    3. Immunization due  Patient is due for his influenza vaccine we will go and get him updated today  - INFLUENZA VACCINE QUAD INJ (PF)       Preventive visit in 1 year, sooner as needed for any concerns.     I have placed vaccination orders.  The MA is preforming vaccination orders under the direction of Dr. Farley    Please note that this dictation was created using voice recognition software. I have made every reasonable attempt to correct obvious errors, but I expect that there are errors of grammar and possibly content that I did not discover before finalizing the note.

## 2021-12-02 DIAGNOSIS — J45.30 MILD PERSISTENT ASTHMA WITHOUT COMPLICATION: ICD-10-CM

## 2021-12-02 RX ORDER — ALBUTEROL SULFATE 90 UG/1
AEROSOL, METERED RESPIRATORY (INHALATION)
Qty: 8.5 EACH | Refills: 4 | Status: SHIPPED | OUTPATIENT
Start: 2021-12-02 | End: 2023-03-17 | Stop reason: SDUPTHER

## 2021-12-02 NOTE — TELEPHONE ENCOUNTER
Received request via: Pharmacy    Was the patient seen in the last year in this department? Yes  10/21/2021  Does the patient have an active prescription (recently filled or refills available) for medication(s) requested? No

## 2022-09-04 DIAGNOSIS — J45.30 MILD PERSISTENT ASTHMA WITHOUT COMPLICATION: ICD-10-CM

## 2022-09-06 RX ORDER — MONTELUKAST SODIUM 10 MG/1
TABLET ORAL
Qty: 90 TABLET | Refills: 3 | Status: SHIPPED | OUTPATIENT
Start: 2022-09-06 | End: 2023-03-17 | Stop reason: SDUPTHER

## 2023-03-17 ENCOUNTER — OFFICE VISIT (OUTPATIENT)
Dept: MEDICAL GROUP | Facility: MEDICAL CENTER | Age: 28
End: 2023-03-17
Payer: COMMERCIAL

## 2023-03-17 VITALS
RESPIRATION RATE: 16 BRPM | HEART RATE: 73 BPM | OXYGEN SATURATION: 97 % | TEMPERATURE: 98.2 F | SYSTOLIC BLOOD PRESSURE: 118 MMHG | WEIGHT: 232 LBS | HEIGHT: 71 IN | BODY MASS INDEX: 32.48 KG/M2 | DIASTOLIC BLOOD PRESSURE: 62 MMHG

## 2023-03-17 DIAGNOSIS — Z13.6 ENCOUNTER FOR LIPID SCREENING FOR CARDIOVASCULAR DISEASE: ICD-10-CM

## 2023-03-17 DIAGNOSIS — Z00.00 ENCOUNTER FOR PREVENTATIVE ADULT HEALTH CARE EXAMINATION: ICD-10-CM

## 2023-03-17 DIAGNOSIS — Z23 IMMUNIZATION DUE: ICD-10-CM

## 2023-03-17 DIAGNOSIS — Z13.1 SCREENING FOR DIABETES MELLITUS: ICD-10-CM

## 2023-03-17 DIAGNOSIS — J45.30 MILD PERSISTENT ASTHMA WITHOUT COMPLICATION: ICD-10-CM

## 2023-03-17 DIAGNOSIS — E55.9 VITAMIN D INSUFFICIENCY: ICD-10-CM

## 2023-03-17 DIAGNOSIS — Z13.0 SCREENING FOR DEFICIENCY ANEMIA: ICD-10-CM

## 2023-03-17 DIAGNOSIS — Z13.220 ENCOUNTER FOR LIPID SCREENING FOR CARDIOVASCULAR DISEASE: ICD-10-CM

## 2023-03-17 PROCEDURE — 99395 PREV VISIT EST AGE 18-39: CPT | Mod: 25 | Performed by: FAMILY MEDICINE

## 2023-03-17 PROCEDURE — 90471 IMMUNIZATION ADMIN: CPT | Performed by: FAMILY MEDICINE

## 2023-03-17 PROCEDURE — 90677 PCV20 VACCINE IM: CPT | Performed by: FAMILY MEDICINE

## 2023-03-17 NOTE — LETTER
March 17, 2023    To Whom It May Concern:         This is confirmation that Ryan Washington attended his scheduled appointment with LAZARO Roth on 3/17/23.  Please excuse him from work today for attending his scheduled appointment in the clinic.         If you have any questions please do not hesitate to call me at the phone number listed below.    Sincerely,          BONIFACIO Roth.  [phone]

## 2023-03-17 NOTE — ASSESSMENT & PLAN NOTE
Anticipatory guidance:  --Discussed moderation in sodium/caffeine intake, saturated fat and cholesterol, caloric balance, sufficient fresh fruits/vegetables, and fiber.  --Discussed brushing, flossing, and dental visits.   --Encouraged regular exercise, 150 mins a week of moderate to high intensity cardiovascular activity  --Discussed tobacco, alcohol, or other drug use; availability of treatment for abuse.   --Discussed sexually transmitted infections, partner selection, use of condoms, avoidance of unintended pregnancy and contraceptive alternatives.  --Injury prevention: Discussed safety belts, safety helmets, smoke detector, etc.  -Discussed diet and exercise and colorectal cancer screening     Health maintenance: Due for hep C, influenza, pneumonia, and COVID booster.  Immunizations per orders  Labs per orders

## 2023-03-17 NOTE — ASSESSMENT & PLAN NOTE
Chronic, Stable.  Continue taking vitamin D supplementations.  We will check vitamin D levels in 1 year.

## 2023-03-17 NOTE — ASSESSMENT & PLAN NOTE
Chronic, stable.  Albuterol 108 mcg/ACT aerosol solution inhalation 2 puffs every 4 hours as needed  Refill montelukast sent to his preferred pharmacy due to prediction of possible bad allergy season.

## 2023-03-17 NOTE — PROGRESS NOTES
Subjective:     CC:   Chief Complaint   Patient presents with    Annual Exam       HPI:   Ryan Washington is a 27 y.o. male who presents for annual exam    Problem   Encounter for Preventative Adult Health Care Examination    Last Colorectal Cancer Screening: Due at 44 years  Hx STDs: No  Cholesterol Screenin2021 LDL 88, HDL 32   Diabetes Screenin2021 A1C 5.3  Hep C Screening: Due    Exercise: Weights, Cardio  Diet: Regular      Urinary symptoms: None    Advanced directive: N/A  Substance Abuse: No concerns   Seat belts, bike helmet, gun safety discussed.  Sun protection used.  Routine Dental: [place] daily and follows with dentist    Immunizations  Influenza: Due    HPV:  up to date    Tetanus: 10/2018    Shingles: n/a    COVID: Due for booster  Pneumococcal : D    Hep B series: up to date   Other immunizations: None      Vitamin D Insufficiency    History of low vitamin D levels, currently taking vitamin D supplementation.     Mild Persistent Asthma Without Complication    History of asthma, would like a refill of his albuterol inhaler.  Has been doing pretty good in terms of his asthma control.  Has not been using the montelukast or need for Flonase at this time.          He  has a past medical history of Diverticulitis and Unspecified asthma(493.90).  He  has no past surgical history on file.    Family History   Problem Relation Age of Onset    Diabetes Mother     No Known Problems Father     Diabetes Maternal Grandmother     Diabetes Maternal Grandfather     Diabetes Paternal Grandfather     Other Brother         diverticulitis    Sleep Apnea Brother      Social History     Tobacco Use    Smoking status: Never    Smokeless tobacco: Former     Quit date: 3/30/2020   Vaping Use    Vaping Use: Never used   Substance Use Topics    Alcohol use: Not Currently    Drug use: Yes     Types: Marijuana, Inhaled     Comment: Marijuana     He  reports that he is not currently sexually active and has had  "partner(s) who are female.    Patient Active Problem List    Diagnosis Date Noted    Encounter for preventative adult health care examination 03/17/2023    Vitamin D insufficiency 05/05/2021    Mild persistent asthma without complication 03/30/2021    Obesity (BMI 30-39.9) 03/30/2021     Current Outpatient Medications   Medication Sig Dispense Refill    albuterol 108 (90 Base) MCG/ACT Aero Soln inhalation aerosol Inhale 2 Puffs every 6 hours as needed for Shortness of Breath. 8.5 Each 4    montelukast (SINGULAIR) 10 MG Tab Take 1 Tablet by mouth every evening. 90 Tablet 3    Cholecalciferol (VITAMIN D) 50 MCG (2000 UT) Cap Take  by mouth.      Fluticasone Propionate (FLONASE ALLERGY RELIEF NA) Administer  into affected nostril(S).      Psyllium (METAMUCIL PO) Take  by mouth.       No current facility-administered medications for this visit.     No Known Allergies    Review of Systems   Constitutional: Negative for fever, chills and malaise/fatigue.   HENT: Negative for congestion.    Eyes: Negative for pain.   Respiratory: Negative for cough and shortness of breath.    Cardiovascular: Negative for chest pain and leg swelling.   Gastrointestinal: Negative for nausea, vomiting, abdominal pain and diarrhea.   Genitourinary: Negative for dysuria and hematuria.   Skin: Negative for rash.   Neurological: Negative for dizziness, focal weakness and headaches.   Endo/Heme/Allergies: Does not bruise/bleed easily.   Psychiatric/Behavioral: Negative for depression.  The patient is not nervous/anxious.      Objective:   /62 (BP Location: Left arm, Patient Position: Sitting, BP Cuff Size: Adult)   Pulse 73   Temp 36.8 °C (98.2 °F) (Temporal)   Resp 16   Ht 1.803 m (5' 11\")   Wt 105 kg (232 lb)   SpO2 97%   BMI 32.36 kg/m²      Wt Readings from Last 4 Encounters:   03/17/23 104 kg (230 lb)   03/17/23 105 kg (232 lb)   10/21/21 102 kg (224 lb 6.9 oz)   06/19/21 106 kg (233 lb 11 oz)       Physical Exam  Constitutional: "       General: He is not in acute distress.  HENT:      Head: Normocephalic and atraumatic.      Right Ear: Tympanic membrane and external ear normal.      Left Ear: Tympanic membrane and external ear normal.   Eyes:      General: Lids are normal.      Extraocular Movements: Extraocular movements intact.      Conjunctiva/sclera: Conjunctivae normal.      Pupils: Pupils are equal, round, and reactive to light.   Neck:      Trachea: Trachea normal.   Cardiovascular:      Rate and Rhythm: Normal rate and regular rhythm.      Heart sounds: Normal heart sounds. No murmur heard.    No friction rub. No gallop.   Pulmonary:      Effort: Pulmonary effort is normal. No accessory muscle usage.      Breath sounds: Normal breath sounds. No wheezing or rales.   Abdominal:      General: Bowel sounds are normal.      Palpations: Abdomen is soft.      Tenderness: There is no abdominal tenderness.   Musculoskeletal:      Cervical back: Normal range of motion and neck supple.      Right lower leg: No edema.      Left lower leg: No edema.   Lymphadenopathy:      Cervical: No cervical adenopathy.   Skin:     General: Skin is warm and dry.      Findings: No rash.   Neurological:      General: No focal deficit present.      Mental Status: He is alert and oriented to person, place, and time. Mental status is at baseline.      GCS: GCS eye subscore is 4. GCS verbal subscore is 5. GCS motor subscore is 6.      Motor: No weakness.      Gait: Gait is intact.   Psychiatric:         Attention and Perception: Attention normal.         Mood and Affect: Mood and affect normal.         Speech: Speech normal.       Imaging:  No imaging    Labs  No up dated labs    Assessment and Plan:        Problem List Items Addressed This Visit       Mild persistent asthma without complication     Chronic, stable.  Albuterol 108 mcg/ACT aerosol solution inhalation 2 puffs every 4 hours as needed  Refill montelukast sent to his preferred pharmacy due to prediction  of possible bad allergy season.         Vitamin D insufficiency     Chronic, Stable.  Continue taking vitamin D supplementations.  We will check vitamin D levels in 1 year.         Relevant Orders    VITAMIN D,25 HYDROXY (DEFICIENCY)    Encounter for preventative adult health care examination     Anticipatory guidance:  --Discussed moderation in sodium/caffeine intake, saturated fat and cholesterol, caloric balance, sufficient fresh fruits/vegetables, and fiber.  --Discussed brushing, flossing, and dental visits.   --Encouraged regular exercise, 150 mins a week of moderate to high intensity cardiovascular activity  --Discussed tobacco, alcohol, or other drug use; availability of treatment for abuse.   --Discussed sexually transmitted infections, partner selection, use of condoms, avoidance of unintended pregnancy and contraceptive alternatives.  --Injury prevention: Discussed safety belts, safety helmets, smoke detector, etc.  -Discussed diet and exercise and colorectal cancer screening     Health maintenance: Due for hep C, influenza, pneumonia, and COVID booster.  Immunizations per orders  Labs per orders          Other Visit Diagnoses       Immunization due        Relevant Orders    Pneumococcal Conjugate Vaccine 20-Valent (19 yrs+) (Completed)    Screening for diabetes mellitus        Relevant Orders    Comp Metabolic Panel    ESTIMATED GFR    Screening for deficiency anemia        Relevant Orders    CBC WITH DIFFERENTIAL    Encounter for lipid screening for cardiovascular disease        Relevant Orders    Lipid Profile             Follow-up: Return in about 1 year (around 3/17/2024), or if symptoms worsen or fail to improve, for Annual.    Please note that this dictation was created using voice recognition software. I have made every reasonable attempt to correct obvious errors, but I expect that there are errors of grammar and possibly content that I did not discover before finalizing the note.

## 2024-03-09 ENCOUNTER — HOSPITAL ENCOUNTER (OUTPATIENT)
Dept: LAB | Facility: MEDICAL CENTER | Age: 29
End: 2024-03-09
Attending: FAMILY MEDICINE
Payer: COMMERCIAL

## 2024-03-09 DIAGNOSIS — Z13.0 SCREENING FOR DEFICIENCY ANEMIA: ICD-10-CM

## 2024-03-09 DIAGNOSIS — Z13.1 SCREENING FOR DIABETES MELLITUS: ICD-10-CM

## 2024-03-09 DIAGNOSIS — E55.9 VITAMIN D INSUFFICIENCY: ICD-10-CM

## 2024-03-09 DIAGNOSIS — Z13.220 ENCOUNTER FOR LIPID SCREENING FOR CARDIOVASCULAR DISEASE: ICD-10-CM

## 2024-03-09 DIAGNOSIS — Z13.6 ENCOUNTER FOR LIPID SCREENING FOR CARDIOVASCULAR DISEASE: ICD-10-CM

## 2024-03-09 LAB
25(OH)D3 SERPL-MCNC: 34 NG/ML (ref 30–100)
ALBUMIN SERPL BCP-MCNC: 4.3 G/DL (ref 3.2–4.9)
ALBUMIN/GLOB SERPL: 1.3 G/DL
ALP SERPL-CCNC: 73 U/L (ref 30–99)
ALT SERPL-CCNC: 13 U/L (ref 2–50)
ANION GAP SERPL CALC-SCNC: 8 MMOL/L (ref 7–16)
AST SERPL-CCNC: 19 U/L (ref 12–45)
BASOPHILS # BLD AUTO: 0.8 % (ref 0–1.8)
BASOPHILS # BLD: 0.05 K/UL (ref 0–0.12)
BILIRUB SERPL-MCNC: 0.3 MG/DL (ref 0.1–1.5)
BUN SERPL-MCNC: 14 MG/DL (ref 8–22)
CALCIUM ALBUM COR SERPL-MCNC: 9 MG/DL (ref 8.5–10.5)
CALCIUM SERPL-MCNC: 9.2 MG/DL (ref 8.5–10.5)
CHLORIDE SERPL-SCNC: 105 MMOL/L (ref 96–112)
CHOLEST SERPL-MCNC: 115 MG/DL (ref 100–199)
CO2 SERPL-SCNC: 27 MMOL/L (ref 20–33)
CREAT SERPL-MCNC: 0.79 MG/DL (ref 0.5–1.4)
EOSINOPHIL # BLD AUTO: 0.3 K/UL (ref 0–0.51)
EOSINOPHIL NFR BLD: 4.6 % (ref 0–6.9)
ERYTHROCYTE [DISTWIDTH] IN BLOOD BY AUTOMATED COUNT: 39.4 FL (ref 35.9–50)
GLOBULIN SER CALC-MCNC: 3.2 G/DL (ref 1.9–3.5)
GLUCOSE SERPL-MCNC: 94 MG/DL (ref 65–99)
HCT VFR BLD AUTO: 41.9 % (ref 42–52)
HDLC SERPL-MCNC: 44 MG/DL
HGB BLD-MCNC: 14.6 G/DL (ref 14–18)
IMM GRANULOCYTES # BLD AUTO: 0.02 K/UL (ref 0–0.11)
IMM GRANULOCYTES NFR BLD AUTO: 0.3 % (ref 0–0.9)
LDLC SERPL CALC-MCNC: 62 MG/DL
LYMPHOCYTES # BLD AUTO: 1.85 K/UL (ref 1–4.8)
LYMPHOCYTES NFR BLD: 28.4 % (ref 22–41)
MCH RBC QN AUTO: 31.6 PG (ref 27–33)
MCHC RBC AUTO-ENTMCNC: 34.8 G/DL (ref 32.3–36.5)
MCV RBC AUTO: 90.7 FL (ref 81.4–97.8)
MONOCYTES # BLD AUTO: 0.61 K/UL (ref 0–0.85)
MONOCYTES NFR BLD AUTO: 9.4 % (ref 0–13.4)
NEUTROPHILS # BLD AUTO: 3.69 K/UL (ref 1.82–7.42)
NEUTROPHILS NFR BLD: 56.5 % (ref 44–72)
NRBC # BLD AUTO: 0 K/UL
NRBC BLD-RTO: 0 /100 WBC (ref 0–0.2)
PLATELET # BLD AUTO: 271 K/UL (ref 164–446)
PMV BLD AUTO: 10.1 FL (ref 9–12.9)
POTASSIUM SERPL-SCNC: 4.5 MMOL/L (ref 3.6–5.5)
PROT SERPL-MCNC: 7.5 G/DL (ref 6–8.2)
RBC # BLD AUTO: 4.62 M/UL (ref 4.7–6.1)
SODIUM SERPL-SCNC: 140 MMOL/L (ref 135–145)
TRIGL SERPL-MCNC: 45 MG/DL (ref 0–149)
WBC # BLD AUTO: 6.5 K/UL (ref 4.8–10.8)

## 2024-03-09 PROCEDURE — 36415 COLL VENOUS BLD VENIPUNCTURE: CPT

## 2024-03-09 PROCEDURE — 82306 VITAMIN D 25 HYDROXY: CPT

## 2024-03-09 PROCEDURE — 80061 LIPID PANEL: CPT

## 2024-03-09 PROCEDURE — 85025 COMPLETE CBC W/AUTO DIFF WBC: CPT

## 2024-03-09 PROCEDURE — 80053 COMPREHEN METABOLIC PANEL: CPT

## 2024-03-22 ENCOUNTER — OFFICE VISIT (OUTPATIENT)
Dept: MEDICAL GROUP | Facility: MEDICAL CENTER | Age: 29
End: 2024-03-22
Payer: COMMERCIAL

## 2024-03-22 VITALS
SYSTOLIC BLOOD PRESSURE: 124 MMHG | WEIGHT: 213 LBS | DIASTOLIC BLOOD PRESSURE: 74 MMHG | HEART RATE: 87 BPM | BODY MASS INDEX: 29.71 KG/M2 | TEMPERATURE: 98.3 F | OXYGEN SATURATION: 98 %

## 2024-03-22 DIAGNOSIS — M25.562 CHRONIC PAIN OF LEFT KNEE: ICD-10-CM

## 2024-03-22 DIAGNOSIS — G89.29 CHRONIC PAIN OF LEFT KNEE: ICD-10-CM

## 2024-03-22 DIAGNOSIS — D64.9 NORMOCYTIC ANEMIA: ICD-10-CM

## 2024-03-22 DIAGNOSIS — J45.30 MILD PERSISTENT ASTHMA WITHOUT COMPLICATION: ICD-10-CM

## 2024-03-22 PROCEDURE — 99214 OFFICE O/P EST MOD 30 MIN: CPT | Performed by: FAMILY MEDICINE

## 2024-03-22 PROCEDURE — 3074F SYST BP LT 130 MM HG: CPT | Performed by: FAMILY MEDICINE

## 2024-03-22 PROCEDURE — 3078F DIAST BP <80 MM HG: CPT | Performed by: FAMILY MEDICINE

## 2024-03-22 RX ORDER — MONTELUKAST SODIUM 10 MG/1
10 TABLET ORAL EVERY EVENING
Qty: 90 TABLET | Refills: 3 | Status: SHIPPED | OUTPATIENT
Start: 2024-03-22

## 2024-03-22 RX ORDER — ALBUTEROL SULFATE 90 UG/1
2 AEROSOL, METERED RESPIRATORY (INHALATION) EVERY 6 HOURS PRN
Qty: 8.5 EACH | Refills: 4 | Status: SHIPPED | OUTPATIENT
Start: 2024-03-22

## 2024-03-22 ASSESSMENT — FIBROSIS 4 INDEX: FIB4 SCORE: 0.56

## 2024-03-22 ASSESSMENT — PATIENT HEALTH QUESTIONNAIRE - PHQ9: CLINICAL INTERPRETATION OF PHQ2 SCORE: 0

## 2024-03-22 NOTE — PROGRESS NOTES
Verbal consent was acquired by the patient to use [employer] ambient listening note generation during this visit:  Yes      Chief complaint::Diagnoses of Normocytic anemia, Chronic pain of left knee, and Mild persistent asthma without complication were pertinent to this visit.    Assessment and Plan:   The following treatment plan was discussed:     Assessment & Plan  1. Vitamin D deficiency.  Chronic, stable.  His vitamin D level is slightly low. He will increase his vitamin D supplement to 4000 units in the winter and 2000 units in the summer if he is outside a lot.    2. Left knee pain.  Chronic, unstable.  He did not have pain or deformity after trauma, did not fall, or injured it. He has atraumatic pain, swelling, and deformity. I will obtain an x-ray of the left knee. I will refer him to physical therapy. He can take ibuprofen to decrease inflammation. If his symptoms do not improve, we will consider an MRI.    3. Normocytic anemia.  New diagnosis for the patient.  His red blood cells and hematocrit are slightly low. There are no signs of anemia based on the volume and color of his labs. I will recheck his CBC, ferritin, folate, iron, and B12 in 6 months.    4.  Asthma  Chronic, stable.  I refilled his albuterol and montelukast.    Follow-up  He will let me know if he has any questions or concerns.  Ryan was seen today for follow-up and lab results.    Diagnoses and all orders for this visit:    Normocytic anemia  -     CBC WITHOUT DIFFERENTIAL; Future  -     FOLATE; Future  -     FERRITIN; Future  -     IRON/TOTAL IRON BIND; Future  -     VITAMIN B12; Future    Chronic pain of left knee  -     DX-KNEE COMPLETE 4+ LEFT; Future  -     Referral to Physical Therapy    Mild persistent asthma without complication  -     albuterol 108 (90 Base) MCG/ACT Aero Soln inhalation aerosol; Inhale 2 Puffs every 6 hours as needed for Shortness of Breath.  -     montelukast (SINGULAIR) 10 MG Tab; Take 1 Tablet by mouth  every evening.        Followup: Return in about 6 months (around 9/22/2024) for Lab follow-up.    Subjective/HPI:   HPI:    Ryan Washington is a pleasant 28 y.o. male here for   Chief Complaint   Patient presents with    Follow-Up    Lab Results        History of Present Illness  The patient is a 28-year-old male who is here to go over his labs.    He is taking vitamin D supplements. He eats chicken, rice, vegetables, beans, and fast food.    He has been having issues with his left knee for 6 months. He has pain on the lateral side of his left knee when he runs 1 to 1.5 miles. He has not noticed any swelling lately, but it still occasionally hurts. Yesterday, it popped up while he was running. It occurs every time he runs, but lately it has been happening. He does not have any knee braces. His knees do pop. It does not feel unstable, but sometimes he feels like he is limping. He runs on concrete and asphalt.    He needs refills on his albuterol and montelukast. He has not noticed allergies since he started taking montelukast. He had chickenpox as a kid.    Current Medicines (including changes today)  Current Outpatient Medications   Medication Sig Dispense Refill    albuterol 108 (90 Base) MCG/ACT Aero Soln inhalation aerosol Inhale 2 Puffs every 6 hours as needed for Shortness of Breath. 8.5 Each 4    montelukast (SINGULAIR) 10 MG Tab Take 1 Tablet by mouth every evening. 90 Tablet 3    Cholecalciferol (VITAMIN D) 50 MCG (2000 UT) Cap Take  by mouth.      Fluticasone Propionate (FLONASE ALLERGY RELIEF NA) Administer  into affected nostril(S).      Psyllium (METAMUCIL PO) Take  by mouth.       No current facility-administered medications for this visit.     Past Medical/ Surgical History  He  has a past medical history of Diverticulitis and Unspecified asthma(493.90).  He  has no past surgical history on file.       Objective:   /74   Pulse 87   Temp 36.8 °C (98.3 °F)   Wt 96.6 kg (213 lb)   SpO2  98%  Body mass index is 29.71 kg/m².    Physical exam was made by observation   Physical Exam  Constitutional:       General: He is not in acute distress.  HENT:      Head: Normocephalic and atraumatic.   Eyes:      Conjunctiva/sclera: Conjunctivae normal.      Pupils: Pupils are equal, round, and reactive to light.   Pulmonary:      Effort: Pulmonary effort is normal. No respiratory distress.   Abdominal:      General: There is no distension.   Musculoskeletal:      Cervical back: Normal range of motion and neck supple.   Skin:     General: Skin is warm and dry.      Findings: No rash.   Neurological:      Mental Status: He is alert and oriented to person, place, and time.      Gait: Gait is intact.   Psychiatric:         Mood and Affect: Affect normal.          Lab/ Imaging Results:  Results  Laboratory Studies  Labs were reviewed with the patient.    Please note that this dictation was created using voice recognition software. I have made every reasonable attempt to correct obvious errors, but I expect that there are errors of grammar and possibly content that I did not discover before finalizing the note.

## 2024-04-04 ENCOUNTER — PHYSICAL THERAPY (OUTPATIENT)
Dept: PHYSICAL THERAPY | Facility: REHABILITATION | Age: 29
End: 2024-04-04
Attending: FAMILY MEDICINE
Payer: COMMERCIAL

## 2024-04-04 DIAGNOSIS — G89.29 CHRONIC PAIN OF LEFT KNEE: ICD-10-CM

## 2024-04-04 DIAGNOSIS — M25.562 CHRONIC PAIN OF LEFT KNEE: ICD-10-CM

## 2024-04-04 PROCEDURE — 97162 PT EVAL MOD COMPLEX 30 MIN: CPT

## 2024-04-04 PROCEDURE — 97110 THERAPEUTIC EXERCISES: CPT

## 2024-04-04 SDOH — ECONOMIC STABILITY: GENERAL: QUALITY OF LIFE: EXCELLENT

## 2024-04-04 ASSESSMENT — ENCOUNTER SYMPTOMS
QUALITY: ACHING
PAIN SCALE AT HIGHEST: 8
QUALITY: RADIATING
PAIN SCALE AT LOWEST: 0
PAIN SCALE: 3

## 2024-04-04 NOTE — OP THERAPY EVALUATION
"  Outpatient Physical Therapy  INITIAL EVALUATION    [facility] Physical Therapy [address]  [address]  [place] [address]  Phone:  [phone]  Fax:  [phone]    Date of Evaluation: 04/04/2024    Patient: Ryan Riddle  YOB: 1995  MRN: [id number]     Referring Provider: LAZARO Roth  [address]  [address]  [place] [address]   Referring Diagnosis Chronic pain of left knee [M25.562, G89.29]     Time Calculation  Start time: 1115  Stop time: 1204 Time Calculation (min): 49 minutes         Chief Complaint: Knee Problem    Visit Diagnoses     ICD-10-CM   1. Chronic pain of left knee  M25.562    G89.29       Date of onset of impairment: 9/4/2023    Subjective:   History of Present Illness:     Mechanism of injury:  Patient is a 28 year old male who has been referred to physical therapy for left knee pain. Per recent office visit with PCP on 3/22/24, \"He has been having issues with his left knee for 6 months. He has pain on the lateral side of his left knee when he runs 1 to 1.5 miles. He has not noticed any swelling lately, but it still occasionally hurts. Yesterday, it popped up while he was running. It occurs every time he runs, but lately it has been happening. He does not have any knee braces. His knees do pop. It does not feel unstable, but sometimes he feels like he is limping. He runs on concrete and asphalt.\"    Patient reports onset of of left lateral knee pain approximately 7 months ago with running and weightlifting. Denies injury. Describes as a occasional aching that intermittently pops or catches, making it difficult to bend L knee. Pain occurs after running for one mile (runs on level terrain, concrete, or TM; avoids trail running) or squatting. Patient states that he was squatting 400# and discontinued all together due to pain, even at lighter weight. Patient also reports pressure of crossing right ankle over left knee can cause discomfort. " Takes 15-30 minutes after activity stopped for pain to resolve. No issues with normal ADLs.     Eases: ice/heat, rest, changing position, avoiding activities.   Quality of life:  Excellent  Sleep disturbance:  Not disrupted  Pain:     Current pain rating:  3    At best pain ratin    At worst pain ratin    Location:  L lateral joint line and radiating up IT band    Quality:  Aching and radiating    Progression:  Stable  Diagnostic Tests:     Diagnostic Tests Comments:  X-ray of L knee scheduled for 24  Treatments:     None    Activities of Daily Living:     Patient reported ADL status: Work:  at [employer], on feet for 10 hours a day without issues.   Hobbies: softball/baseball   Exercise: 3 days a week during school, 5 days a week outside of school. Running 3 miles and weightlifting.   School: [employer]  Patient Goals:     Patient goals for therapy:  Decreased pain, increased strength and return to sport/leisure activities      Past Medical History:   Diagnosis Date    Diverticulitis     dx in  with hospital admission    Unspecified asthma(493.90)      No past surgical history on file.  Social History     Tobacco Use    Smoking status: Never    Smokeless tobacco: Former     Quit date: 3/30/2020   Substance Use Topics    Alcohol use: Not Currently     Family and Occupational History     Socioeconomic History    Marital status: Single     Spouse name: Not on file    Number of children: Not on file    Years of education: Not on file    Highest education level: 12th grade   Occupational History    Not on file       Objective     Neurological Testing     Sensation     Knee   Left Knee   Intact: Light touch    Right Knee   Intact: light touch     Reflexes   Left   Patellar (L4): normal (2+)  Achilles (S1): normal (2+)    Right   Patellar (L4): normal (2+)  Achilles (S1): normal (2+)    Palpation   Left   No palpable tenderness to the lateral gastrocnemius and medial gastrocnemius.    Tenderness of the distal biceps femoris, distal semimembranosus and distal semitendinosus.     Active Range of Motion   Left Knee   Flexion: 130 (slight discomfort at EROM) degrees   Extension: WFL    Right Knee   Flexion: 135 degrees   Extension: WFL    Patellar Mobility   Left Knee Patellar tendons within functional limits include the superior and inferior. Hypomobile in the left medial and left lateral patellar tendon(s).     Right Knee Patellar tendons within functional limits include the medial, lateral, superior and inferior.     Strength:      Left Hip   Planes of Motion   Flexion: 5  Extension: 4+  Abduction: 4+  Adduction: 4+    Right Hip   Planes of Motion   Flexion: 5  Extension: 4+  Abduction: 5  Adduction: 5    Left Knee   Flexion: 4+  Extension: 5 (Reproduction of knee pain)    Right Knee   Flexion: 4+  Extension: 5    Left Ankle/Foot   Dorsiflexion: 5  Plantar flexion: 5  Inversion: 5  Eversion: 5    Right Ankle/Foot   Dorsiflexion: 5  Plantar flexion: 5  Inversion: 5  Eversion: 5    Tests     Left Knee   Positive Thessaly's test at 5 degrees.   Negative anterior drawer, lateral Walter, medial Walter, posterior drawer, valgus stress test at 0 degrees, valgus stress test at 30 degrees, varus stress test at 0 degrees and varus stress test at 30 degrees.     Additional Tests Details  +SLR bilaterally for hamstring tightness     Functional Assessment     Comments  Good squat mechanics but popping of L knee upon achieving full depth.     General Comments     Knee Comments  No swelling observed        Therapeutic Exercises (CPT 93220):     1. Education on exam findings and PT POC    2. Brigde on ball + HS curl, 3 x 15, Added to HEP    3. Alt. LE flexion with bridge on ball, 3 x 15`, Added to HEP    4. Sidelying running man, 3 x 15, L within pain free ROM, Added to HEP      Time-based treatments/modalities:    Physical Therapy Timed Treatment Charges  Therapeutic exercise minutes (CPT 97931): 15  minutes      Assessment, Response and Plan:   Assessment details:  Patient is a pleasant 28 year old male who was referred for left knee pain. He presents with the following impairments: slight weakness to right hip abductors, hip extensors, and hamstrings, (+) lateral meniscus during Thessley's, TTP of L ITB with decreased patellar mobility. Based on these evaluation findings, patient would benefit from skilled physical therapy to address these impairments to allow for decreased pain with functional activities and return to leisure activities.     Barriers to therapy:  None  Goals:   Short Term Goals:   1. Instruct in HEP   2. Able to run one mile without pain.   3. Complete LEFS.   Short term goal time span:  2-4 weeks      Long Term Goals:    1. Independent with HEP   2. Able to run for 3 miles without increase in knee pain.   3. Return to squatting body weight without increase in knee pain.   4. Decrease score on LEFS by GOSIA.   Long term goal time span:  4-6 weeks    Plan:   Therapy options:  Physical therapy treatment to continue  Planned therapy interventions:  E Stim Unattended (CPT 07062), Gait Training (CPT 08523), Manual Therapy (CPT 28986), Neuromuscular Re-education (CPT 04434), Self Care ADL Training (CPT 72962), Therapeutic Activities (CPT 50836) and Therapeutic Exercise (CPT 14082)  Frequency: 1-2x/week.  Duration in weeks:  8  Discussed with:  Patient      Functional Assessment Used        Referring provider co-signature:  I have reviewed this plan of care and my co-signature certifies the need for services.    Certification Period: 04/04/2024 to  05/30/24    Physician Signature: ________________________________ Date: ______________

## 2024-04-05 ENCOUNTER — APPOINTMENT (OUTPATIENT)
Dept: RADIOLOGY | Facility: MEDICAL CENTER | Age: 29
End: 2024-04-05
Attending: FAMILY MEDICINE
Payer: COMMERCIAL

## 2024-04-05 DIAGNOSIS — G89.29 CHRONIC PAIN OF LEFT KNEE: ICD-10-CM

## 2024-04-05 DIAGNOSIS — M25.562 CHRONIC PAIN OF LEFT KNEE: ICD-10-CM

## 2024-04-05 PROCEDURE — 73564 X-RAY EXAM KNEE 4 OR MORE: CPT | Mod: LT

## 2024-04-11 ENCOUNTER — PHYSICAL THERAPY (OUTPATIENT)
Dept: PHYSICAL THERAPY | Facility: REHABILITATION | Age: 29
End: 2024-04-11
Attending: FAMILY MEDICINE
Payer: COMMERCIAL

## 2024-04-11 DIAGNOSIS — G89.29 CHRONIC PAIN OF LEFT KNEE: ICD-10-CM

## 2024-04-11 DIAGNOSIS — M25.562 CHRONIC PAIN OF LEFT KNEE: ICD-10-CM

## 2024-04-11 PROCEDURE — 97535 SELF CARE MNGMENT TRAINING: CPT

## 2024-04-11 PROCEDURE — 97110 THERAPEUTIC EXERCISES: CPT

## 2024-04-11 NOTE — OP THERAPY DAILY TREATMENT
"Outpatient Physical Therapy  DAILY TREATMENT     [facility] Physical [address].  [address]  [place] [address]  Phone:  [phone]  Fax:  [phone]    Date: 04/11/2024    Patient: Ryan Riddle  YOB: 1995  MRN: [id number]     Time Calculation    Start time: 0815  Stop time: 0910 Time Calculation (min): 55 minutes         Chief Complaint: No chief complaint on file.    Visit #: 2    SUBJECTIVE:  Same pain in knee w/aggs. However pt. Is able to do recreation 3x/wk w/mild pain afterward. Presently he stretches all major m. Groups except ITB   Location:  L lateral joint line and radiating up IT band    Quality:  Aching and radiating    Progression:  Stable  OBJECTIVE:  Current objective measures:   +L Obers test          Therapeutic Exercises (CPT 25928):     1. stat. bike seat, L4 3'    2. HSS, ITB stretch, supine strap 30\" ea.    3. SL hip abd/glut med, 2x10    4. bridge DL -hold, 30\" knee discomfort    5. SLB, L 25\", wobble toward end    6. SLB w/fwd reach, L ,R 10, goes into genu varus      Therapeutic Exercise Summary: Post Op Knee Phase 2 [[id number]]    BRIDGING WITH PILLOW SQUEEZE -  Repeat 10 Repetitions, Hold 10 Seconds, Complete 1 Set, Perform 3 Times a Week    SINGLE LEG STANCE - SLS -  Repeat 2 Repetitions, Hold 30 Seconds, Complete 1 Set, Perform 3 Times a Week    IT Band Stretch- Supine  - ITB -  Repeat 3 Repetitions, Hold 25 Seconds, Perform 1 Times a Day    Quadriceps stretch -  Repeat 3 Repetitions, Hold 30 Seconds, Perform 1 Times a Day    Standing Calf stretch -  Repeat 3 Repetitions, Hold 30 Seconds, Perform 1 Times a Day    Hamstring Stretch - alternate way -  Repeat 3 Repetitions, Hold 30 Seconds, Perform 1 Times a Day    Therapeutic Treatments and Modalities:     1. Manual Therapy (CPT 77492), L KNEE, see summary    2. Hot or Cold Pack Therapy (CPT 32056), L knee sup HL x15, NC    3. Self Care ADL Training (CPT 70550), hep instruction, see ex. " summary    Therapeutic Treatment and Modalities Summary: STM L ITB/TFL m., L PFJ med glides G3- to 3+ 3 bouts.    Time-based treatments/modalities:    Physical Therapy Timed Treatment Charges  Functional training, self care minutes (CPT 94079): 10 minutes  Therapeutic exercise minutes (CPT 39383): 28 minutes      Pain rating (1-10) before treatment:  0  Pain rating (1-10) after treatment:  0    ASSESSMENT:   Response to treatment: tolerant to TE exception: minor irritation w/ ITB stretch, end of bridge hold. Dec. Stability at knee w/SLS both static, dynamically  P/w slight weakness R post. chain, (+) lateral meniscus during Thessley's, TTP of L ITB with decreased patellar mobility.    PLAN/RECOMMENDATIONS:   Plan for treatment: therapy treatment to continue next visit.  Planned interventions for next visit: continue with current treatment.

## 2024-04-17 ENCOUNTER — PHYSICAL THERAPY (OUTPATIENT)
Dept: PHYSICAL THERAPY | Facility: REHABILITATION | Age: 29
End: 2024-04-17
Attending: FAMILY MEDICINE
Payer: COMMERCIAL

## 2024-04-17 DIAGNOSIS — M25.562 LEFT KNEE PAIN, UNSPECIFIED CHRONICITY: ICD-10-CM

## 2024-04-17 PROCEDURE — 97140 MANUAL THERAPY 1/> REGIONS: CPT

## 2024-04-17 PROCEDURE — 97014 ELECTRIC STIMULATION THERAPY: CPT

## 2024-04-17 PROCEDURE — 97110 THERAPEUTIC EXERCISES: CPT

## 2024-04-17 NOTE — OP THERAPY DAILY TREATMENT
Outpatient Physical Therapy  DAILY TREATMENT     [facility] Physical [address].  [address]  [place] [address]  Phone:  [phone]  Fax:  [phone]    Date: 04/17/2024    Patient: Ryan Riddle  YOB: 1995  MRN: [id number]     Time Calculation    Start time: 0815  Stop time: 0910 Time Calculation (min): 55 minutes         Chief Complaint: No chief complaint on file.    Visit #: 3    SUBJECTIVE:  Mon. Night was playing softball and when I went from base to base and my knees tightened/locked up, had trouble sprinting.  Had to get assistance off field, did RICE, and consulted sibling who is a nurse who rec: NSAIDs 1 tab 4-6 hrs.  missed work on Tuesday.      Pain rating (1-10) before treatment:  4-5  Pain rating (1-10) after treatment:  2-3  Location:  L ant. Thigh prox to 1/2 distal    Quality:  tight and painful at times      OBJECTIVE:  Current objective measures:   L Knee F 0-120*  Palp + rectus fem proximal and 1/2 distal, hypertension anteromedial fibers         Therapeutic Exercises (CPT 97620):     1. stat. bike seat, hold    2. HSS, ITB stretch, -, ok to try over weekend    3. SL hip abd/glut med, hold    4. bridge DL -hold    5. SLB, hold, wobble toward end    6. SLB w/fwd reach, hold, goes into genu varus    7. heel slides, quad sets, 10, 10, slight pain w/HS last 2-3 reps      Therapeutic Exercise Summary: Post Op Knee Phase 2 [[id number]]    BRIDGING WITH PILLOW SQUEEZE -  Repeat 10 Repetitions, Hold 10 Seconds, Complete 1 Set, Perform 3 Times a Week    SINGLE LEG STANCE - SLS -  Repeat 2 Repetitions, Hold 30 Seconds, Complete 1 Set, Perform 3 Times a Week    IT Band Stretch- Supine  - ITB -  Repeat 3 Repetitions, Hold 25 Seconds, Perform 1 Times a Day    Quadriceps stretch -  Repeat 3 Repetitions, Hold 30 Seconds, Perform 1 Times a Day    Standing Calf stretch -  Repeat 3 Repetitions, Hold 30 Seconds, Perform 1 Times a Day    Hamstring Stretch - alternate way  -  Repeat 3 Repetitions, Hold 30 Seconds, Perform 1 Times a Day    Therapeutic Treatments and Modalities:     1. Manual Therapy (CPT 95790), L KNEE, see summary    2. E Stim Unattended (CPT 59955), IFC L prox rectus fem x15 w/CP    3. Self Care ADL Training (CPT 39323), home instruction, icing, hs, qs, stretch HS and ITB during acute phase    Therapeutic Treatment and Modalities Summary: STM L rectus fem - prox 1/2 - gentle stripping, bending, j-strokes; KT tape 'I' strip to L rectus fem - inhibition, 'v' strip L rectus - middle unloading      Time-based treatments/modalities:    Physical Therapy Timed Treatment Charges  Manual therapy minutes (CPT 22133): 20 minutes  Therapeutic exercise minutes (CPT 34987): 15 minutes        ASSESSMENT:   Presents w/ acute injury - L rectus fem strain  Response to treatment: tolerant to gentle massage, and low level exercises. E stim added to address inflammation; KT tape applied d/t acute injury.  No c/o knee pain    PLAN/RECOMMENDATIONS:   Plan for treatment: therapy treatment to continue next visit.  Planned interventions for next visit: continue with current treatment.

## 2024-05-09 ENCOUNTER — PHYSICAL THERAPY (OUTPATIENT)
Dept: PHYSICAL THERAPY | Facility: REHABILITATION | Age: 29
End: 2024-05-09
Attending: FAMILY MEDICINE
Payer: COMMERCIAL

## 2024-05-09 DIAGNOSIS — G89.29 CHRONIC PAIN OF LEFT KNEE: ICD-10-CM

## 2024-05-09 DIAGNOSIS — M25.562 CHRONIC PAIN OF LEFT KNEE: ICD-10-CM

## 2024-05-09 NOTE — OP THERAPY DAILY TREATMENT
"Outpatient Physical Therapy  DAILY TREATMENT     [facility] Physical [address].  [address]  [place] [address]  Phone:  [phone]  Fax:  [phone]    Date: 05/09/2024    Patient: Ryan Riddle  YOB: 1995  MRN: [id number]     Time Calculation    Start time: 0815  Stop time: 0900 Time Calculation (min): 45 minutes         Chief Complaint: No chief complaint on file.    Visit #: 4    SUBJECTIVE:  So this past Mon.I felt great and he has been running at a pace of 10.5 min. Pace, we he tries to sprint he cannot.. Mond he tried to return to sport  but the same problem occujrred. 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     Pain rating (1-10) before treatment:  0-5  Pain rating (1-10) after treatment:  2-3  Location:  L ant. Thigh prox to distal    Quality:  tight and painful at times      OBJECTIVE:  Current objective measures:   L Knee F 0-120*  Palp + rectus fem proximal and 1/2 distal, hypertension anteromedial fibers         Therapeutic Exercises (CPT 69676):     1. TM walk/jog, 8' 3-4MPH    2. L kathe stretch, 1' hangs, then kick back, w/MFD    3. SL hip abd/glut med, hold    4. bridge DL -hold    5. SLB, hold, wobble toward end    6. SLB w/fwd reach, hold, goes into genu varus    7. heel slides,, 10,, w/MFD    8. sit>stand L, 5 lower height*  10 higher than KJ, pain initial    9. box jump, DL>DL 10 8\", p+ after      Therapeutic Exercise Summary: Post Op Knee Phase 2 [[id number]]    BRIDGING WITH PILLOW SQUEEZE -  Repeat 10 Repetitions, Hold 10 Seconds, Complete 1 Set, Perform 3 Times a Week    SINGLE LEG STANCE - SLS -  Repeat 2 Repetitions, Hold 30 Seconds, Complete 1 Set, Perform 3 Times a Week    IT Band Stretch- Supine  - ITB -  Repeat 3 Repetitions, Hold 25 Seconds, Perform 1 Times a Day    Quadriceps stretch -  Repeat 3 Repetitions, Hold 30 Seconds, Perform 1 Times a Day    Standing Calf stretch -  Repeat 3 Repetitions, Hold 30 Seconds, Perform 1 Times a " Day    Hamstring Stretch - alternate way -  Repeat 3 Repetitions, Hold 30 Seconds, Perform 1 Times a Day    Therapeutic Treatments and Modalities:     1. Manual Therapy (CPT 17073), L KNEE, see summary    3. Self Care ADL Training (CPT 28895), home instruction/MFD aftercare, avoid icing for 3 hours; cont. fast pace walk-jog.    Therapeutic Treatment and Modalities Summary: Iastm: L rectus fem ;MFD L rectus fem from MTJ distal - multi cup. 1 cup slides. KT tape 'I' strip to L rectus fem 35% tot- facilitation 'v' strip PFJ      Time-based treatments/modalities:    Physical Therapy Timed Treatment Charges  Functional training, self care minutes (CPT 78316): 2 minutes  Manual therapy minutes (CPT 87195): 23 minutes  Therapeutic exercise minutes (CPT 08597): 15 minutes        ASSESSMENT:   Presents w/ acute injury - L rectus fem strain - exacerbation.  Response to treatment: tolerant to MFD, pain w/ajility train- beg. Ok w/walk-jog trial.     PLAN/RECOMMENDATIONS:   Plan for treatment: therapy treatment to continue next visit.  Planned interventions for next visit: continue with current treatment.

## 2024-05-16 ENCOUNTER — PHYSICAL THERAPY (OUTPATIENT)
Dept: PHYSICAL THERAPY | Facility: REHABILITATION | Age: 29
End: 2024-05-16
Attending: FAMILY MEDICINE
Payer: COMMERCIAL

## 2024-05-16 DIAGNOSIS — G89.29 CHRONIC PAIN OF LEFT KNEE: ICD-10-CM

## 2024-05-16 DIAGNOSIS — M25.562 CHRONIC PAIN OF LEFT KNEE: ICD-10-CM

## 2024-05-16 NOTE — OP THERAPY DAILY TREATMENT
"Outpatient Physical Therapy  DAILY TREATMENT     [facility] Physical [address].  [address]  [place] [address]  Phone:  [phone]  Fax:  [phone]    Date: 05/16/2024    Patient: Ryan Riddle  YOB: 1995  MRN: [id number]     Time Calculation    Start time: 1400  Stop time: 1445 Time Calculation (min): 45 minutes         Chief Complaint: No chief complaint on file.    Visit #: 5    SUBJECTIVE:  I am running and resting it.  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 Thigh prox to distal    Quality:  tight and painful at times      OBJECTIVE:  Current objective measures:   L Knee F 0-120*  Palp + rectus fem proximal and 1/2 distal, hypertension anteromedial fibers         Therapeutic Exercises (CPT 61433):     1. TM walk/jog, -    2. L kathe stretch, -, hep    3. SL hip abd/glut med, -    4. lateral, lateral and posterior stepping, 2 laps ea. Purple TB    5. SLSquat - trx, L 2x10 ; r 1x10    6. star stepping, L 2x fatigue    7. rocker board a-p, laterals, 1' ea    8. sit>stand L, -, hep    9. box jump, DL>DL , DL to L SL 10 ea  8\"    10. ladder agility drills, fwd DL, fwd SL, lateral SL, diagonal DL      Therapeutic Exercise Summary: Post Op Knee Phase 2 [[id number]]    BRIDGING WITH PILLOW SQUEEZE -  Repeat 10 Repetitions, Hold 10 Seconds, Complete 1 Set, Perform 3 Times a Week    LE Split-stance ABS  [[id number]]    COMMENT: Do exercises 3x/week. Week 1: 1 set of 10-14 repetitions. Week 2: 2 x 10-14 reps. Week 3 x 10-14 reps. On Alternate days do cardio and stretch Quads, Hamstrings &amp; Calves.    Single Leg Squat  -  Repeat 10 Repetitions, Hold 5 Seconds, Complete 1 Set, Perform 1 Times a Day    SINGLE LEG BRIDGE -  Repeat 10 Repetitions, Hold 1 Second(s), Complete 1 Set, Perform 1 Times a Day    LATERAL STEPPING-   Place an elastic band " around both feet.  -  Repeat 10 Repetitions, Perform 3 Times a Day    SLB Dynamic.  Set up near a wall.(You could also use a foam roller placed in front as a target)  Standing on target leg, bring opposite leg up in front of you.  Next, move leg behind you until you reach a mini-lunge position your  holding weight on target leg. Then return to start. Add arm reach/swing when comfortable w/movement=) -  Repeat 10 Repetitions, Complete 1 Set, Perform 3 Times a Week    Clock Step Out (multidirectional weight shift) -  Repeat 5 Repetitions, Complete 2 Sets, Perform 1 Times a Day    Therapeutic Treatments and Modalities: , see summary    3. Self Care ADL Training (CPT 04798), home instruction handout    Time-based treatments/modalities:    Physical Therapy Timed Treatment Charges  Functional training, self care minutes (CPT 83696): 15 minutes  Therapeutic exercise minutes (CPT 14288): 30 minutes        ASSESSMENT:   Presents w/ acute injury - L rectus fem strain - exacerbation.  Response to treatment: tolerates agility train. No  knee pain. Progressed hep    PLAN/RECOMMENDATIONS:   Plan for treatment: therapy treatment to continue next visit.  Planned interventions for next visit: f/u in mid June continue with current treatment.

## 2024-06-18 ENCOUNTER — PHYSICAL THERAPY (OUTPATIENT)
Dept: PHYSICAL THERAPY | Facility: REHABILITATION | Age: 29
End: 2024-06-18
Attending: FAMILY MEDICINE
Payer: COMMERCIAL

## 2024-06-18 DIAGNOSIS — G89.29 CHRONIC PAIN OF LEFT KNEE: ICD-10-CM

## 2024-06-18 DIAGNOSIS — M25.562 CHRONIC PAIN OF LEFT KNEE: ICD-10-CM

## 2024-06-18 PROCEDURE — 97110 THERAPEUTIC EXERCISES: CPT

## 2024-06-18 PROCEDURE — 97140 MANUAL THERAPY 1/> REGIONS: CPT

## 2024-06-18 NOTE — OP THERAPY DAILY TREATMENT
"Outpatient Physical Therapy  DAILY TREATMENT     [facility] Physical [address].  [address]  [place] [address]  Phone:  [phone]  Fax:  [phone]    Date: 05/16/2024    Patient: Ryan Riddle  YOB: 1995  MRN: [id number]     Time Calculation    Start time: 1400  Stop time: 1445 Time Calculation (min): 45 minutes         Chief Complaint: No chief complaint on file.    Visit #: 5    SUBJECTIVE:  pt states: I'm doing well running and running a relay race in June. \"I can't touch my toes.\"  Pt. Doing some more racing over the Summar/Fall     Pain rating (1-10) before treatment:  0-5  Pain rating (1-10) after treatment:  2-3  Location:  L ant. Thigh prox to distal    Quality:  tight and painful at times      OBJECTIVE:  Current objective measures:   L Knee F 0-120*  Palp + rectus fem proximal and 1/2 distal, hypertension anteromedial fibers         Therapeutic Exercises (CPT 36352):     1. TM walk/jog, -    2. L kathe stretch, -, hep    3. SL hip abd/glut med, -    4. lateral, lateral and posterior stepping, 2 laps ea. Purple TB    5. SLSquat - trx, L 2x10 ; r 1x10    6. star stepping, L 2x fatigue    7. rocker board a-p, laterals, 1' ea    8. sit>stand L, -, hep    9. box jump, DL>DL , DL to L SL 10 ea  8\"    10. ladder agility drills, fwd DL, fwd SL, lateral SL, diagonal DL      Therapeutic Exercise Summary: Post Op Knee Phase 2 [[id number]]    BRIDGING WITH PILLOW SQUEEZE -  Repeat 10 Repetitions, Hold 10 Seconds, Complete 1 Set, Perform 3 Times a Week    LE Split-stance ABS  [[id number]]    COMMENT: Do exercises 3x/week. Week 1: 1 set of 10-14 repetitions. Week 2: 2 x 10-14 reps. Week 3 x 10-14 reps. On Alternate days do cardio and stretch Quads, Hamstrings &amp; Calves.    Single Leg Squat  -  Repeat 10 Repetitions, Hold 5 Seconds, Complete 1 Set, Perform 1 Times a Day    SINGLE LEG BRIDGE -  Repeat 10 Repetitions, Hold 1 Second(s), Complete 1 Set, Perform 1 Times a " "Day    LATERAL STEPPING-   Place an elastic band around both feet.  -  Repeat 10 Repetitions, Perform 3 Times a Day    SLB Dynamic.  Set up near a wall.(You could also use a foam roller placed in front as a target)  Standing on target leg, bring opposite leg up in front of you.  Next, move leg behind you until you reach a mini-lunge position your  holding weight on target leg. Then return to start. Add arm reach/swing when comfortable w/movement=) -  Repeat 10 Repetitions, Complete 1 Set, Perform 3 Times a Week    Clock Step Out (multidirectional weight shift) -  Repeat 5 Repetitions, Complete 2 Sets, Perform 1 Times a Day    Therapeutic Treatments and Modalities: , see summary    3. Self Care ADL Training (CPT 97768), home instruction handout    Time-based treatments/modalities:    Physical Therapy Timed Treatment Charges  Functional training, self care minutes (CPT 27516): 15 minutes  Therapeutic exercise minutes (CPT 85750): 30 minutes        ASSESSMENT:   Presents w/ acute injury - L rectus fem strain - exacerbation.  Response to treatment: tolerates agility train. No  knee pain. Progressed hep    PLAN/RECOMMENDATIONS:   Plan for treatment: therapy treatment to continue next visit.  Planned interventions for next visit: f/u in mid June continue with current treatment.  {AMB OP THERAPY - THERAPY PLAN:[id number]::\"therapy treatment to continue next visit\"}.  Planned interventions for next visit: {PT PLANNED THERAPY INTERVENTIONS:[id number]::\"continue with current treatment\"}.         "

## 2024-06-18 NOTE — OP THERAPY DAILY TREATMENT
"  Outpatient Physical Therapy  DAILY TREATMENT     [facility] [address].  [address]  [place] [address]  Phone:  [phone]  Fax:  [phone]    Date: 06/18/2024    Patient: Ryan Riddle  YOB: 1995  MRN: [id number]     Time Calculation    Start time: 0900  Stop time: 0945 Time Calculation (min): 45 minutes         Chief Complaint: No chief complaint on file.    Visit #: 6    SUBJECTIVE:  pt states: \"I'm doing well, I ran a relay race in June. My concern is I can't touch my toes.\"  Pt. doing some more racing over the Summar/Fall    Pain rating (1-10) before treatment:  .5  Pain rating (1-10) after treatment:  0  Location:  L gluteus    Quality:  [employer]    OBJECTIVE:  Current objective measures:   SLR L: 75 deg.*;  fwd bend FTT mid shin// FTT touch dorsum feet          Therapeutic Exercises (CPT 87538):     1. R sidelying L glut/piriformis stretch, 10, w/MFD    2. ITB/TFL stretch, 10, w/MFD    3. prone L HSS, 10, w/MFD    Therapeutic Treatments and Modalities:     1. Manual Therapy (CPT 30992), MFD  L  piriformis 2 cups; TFL 2 cups, biceps fem. multicup ; IASTM to petechei marks    Time-based treatments/modalities:    Physical Therapy Timed Treatment Charges  Manual therapy minutes (CPT 35451): 25 minutes  Therapeutic exercise minutes (CPT 24374): 15 minutes      ASSESSMENT:   Response to treatment: well w/improved m. Lengths and ability to reach toes.    PLAN/RECOMMENDATIONS:   Plan for treatment: therapy treatment to continue next visit.  Planned interventions for next visit:  1 visit then likely d/c and continue with current treatment.         "

## 2024-06-25 ENCOUNTER — APPOINTMENT (OUTPATIENT)
Dept: PHYSICAL THERAPY | Facility: REHABILITATION | Age: 29
End: 2024-06-25
Attending: FAMILY MEDICINE
Payer: COMMERCIAL

## 2024-07-09 ENCOUNTER — APPOINTMENT (OUTPATIENT)
Dept: PHYSICAL THERAPY | Facility: REHABILITATION | Age: 29
End: 2024-07-09
Attending: FAMILY MEDICINE
Payer: COMMERCIAL

## 2024-12-03 ENCOUNTER — APPOINTMENT (OUTPATIENT)
Dept: MEDICAL GROUP | Facility: MEDICAL CENTER | Age: 29
End: 2024-12-03
Payer: COMMERCIAL

## 2024-12-03 VITALS
WEIGHT: 220 LBS | HEART RATE: 85 BPM | DIASTOLIC BLOOD PRESSURE: 72 MMHG | SYSTOLIC BLOOD PRESSURE: 118 MMHG | BODY MASS INDEX: 30.8 KG/M2 | HEIGHT: 71 IN | TEMPERATURE: 97.8 F | OXYGEN SATURATION: 98 %

## 2024-12-03 DIAGNOSIS — Z23 IMMUNIZATION DUE: ICD-10-CM

## 2024-12-03 DIAGNOSIS — Z02.0 SCHOOL HEALTH EXAMINATION: ICD-10-CM

## 2024-12-03 PROCEDURE — 90656 IIV3 VACC NO PRSV 0.5 ML IM: CPT | Performed by: FAMILY MEDICINE

## 2024-12-03 PROCEDURE — 90471 IMMUNIZATION ADMIN: CPT | Performed by: FAMILY MEDICINE

## 2024-12-03 PROCEDURE — 99213 OFFICE O/P EST LOW 20 MIN: CPT | Mod: 25 | Performed by: FAMILY MEDICINE

## 2024-12-03 PROCEDURE — 3078F DIAST BP <80 MM HG: CPT | Performed by: FAMILY MEDICINE

## 2024-12-03 PROCEDURE — 3074F SYST BP LT 130 MM HG: CPT | Performed by: FAMILY MEDICINE

## 2024-12-03 ASSESSMENT — FIBROSIS 4 INDEX: FIB4 SCORE: 0.58

## 2024-12-03 NOTE — PROGRESS NOTES
Verbal consent was acquired by the patient to use [employer] ambient listening note generation during this visit:  Yes      Chief complaint::Diagnoses of School health examination and Immunization due were pertinent to this visit.    Assessment and Plan:   The following treatment plan was discussed:     Assessment & Plan  1. School health screening - Ordered titers and screenings to confirm immunization and health status  - Ordered titers for rubella, mumps, varicella, and hepatitis B to confirm immunization  - Ordered TB gold QuantiFERON test, HIV, and hepatitis C screenings  - Advised patient to complete labs at the outpatient lab next door    2. Health maintenance - Administered influenza vaccine and discussed COVID booster  - Administered influenza vaccine  - Informed patient about the need for a COVID booster, which they will arrange separately    Follow-up  - Follow up as needed  Ryan was seen today for immunizations.    Diagnoses and all orders for this visit:    School health examination  -     VARICELLA ZOSTER IGG AB; Future  -     MUMPS AB IGG  -     RUBEOLA IGG AB; Future  -     RUBELLA ABS IGG; Future  -     HEP B SURFACE AB; Future  -     HEP C VIRUS ANTIBODY; Future  -     HIV AG/AB COMBO ASSAY SCREENING; Future  -     Quantiferon Gold TB (PPD); Future    Immunization due  -     INFLUENZA VACCINE TRI INJ (PF)         Followup: Return if symptoms worsen or fail to improve.    Subjective/HPI:     HPI:    Ryan Riddle is a pleasant 29 y.o. male here for   Chief Complaint   Patient presents with    Immunizations     For school, hep b, mmr , varicella, neg tb, tdap         History of Present Illness  The patient is a 29-year-old individual presenting for a school health screening.    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 They are interested in receiving the influenza vaccine today and require proof of a negative TB result.    Current Medicines (including  "changes today)  Current Outpatient Medications   Medication Sig Dispense Refill    albuterol 108 (90 Base) MCG/ACT Aero Soln inhalation aerosol Inhale 2 Puffs every 6 hours as needed for Shortness of Breath. 8.5 Each 4    montelukast (SINGULAIR) 10 MG Tab Take 1 Tablet by mouth every evening. 90 Tablet 3    Cholecalciferol (VITAMIN D) 50 MCG (2000 UT) Cap Take  by mouth.      Fluticasone Propionate (FLONASE ALLERGY RELIEF NA) Administer  into affected nostril(S).      Psyllium (METAMUCIL PO) Take  by mouth.       No current facility-administered medications for this visit.     Past Medical/ Surgical History  He  has a past medical history of Diverticulitis and Unspecified asthma(493.90).  He  has no past surgical history on file.       Objective:   /72   Pulse 85   Temp 36.6 °C (97.8 °F)   Ht 1.803 m (5' 11\")   Wt 99.8 kg (220 lb)   SpO2 98%  Body mass index is 30.68 kg/m².      Physical Exam  Constitutional:       General: He is not in acute distress.     Appearance: He is not ill-appearing or toxic-appearing.   HENT:      Head: Normocephalic.      Right Ear: Tympanic membrane and external ear normal.      Left Ear: Tympanic membrane and external ear normal.      Nose: Nose normal. No rhinorrhea.      Mouth/Throat:      Mouth: Mucous membranes are moist.      Pharynx: Oropharynx is clear. No posterior oropharyngeal erythema.   Eyes:      General:         Right eye: No discharge.         Left eye: No discharge.      Conjunctiva/sclera: Conjunctivae normal.      Pupils: Pupils are equal, round, and reactive to light.   Cardiovascular:      Rate and Rhythm: Normal rate and regular rhythm.      Heart sounds: No murmur heard.  Pulmonary:      Effort: Pulmonary effort is normal. No respiratory distress.      Breath sounds: Normal breath sounds. No wheezing.   Abdominal:      General: Abdomen is flat.   Musculoskeletal:         General: No swelling or tenderness.      Cervical back: Normal range of motion and " neck supple.      Right lower leg: No edema.      Left lower leg: No edema.   Skin:     General: Skin is warm.   Neurological:      General: No focal deficit present.      Mental Status: He is alert and oriented to person, place, and time. Mental status is at baseline.   Psychiatric:         Mood and Affect: Mood normal.          Lab/ Imaging Results:  No labs or imaging to review    Please note that this dictation was created using voice recognition software. I have made every reasonable attempt to correct obvious errors, but I expect that there are errors of grammar and possibly content that I did not discover before finalizing the note.

## 2024-12-04 ENCOUNTER — HOSPITAL ENCOUNTER (OUTPATIENT)
Dept: LAB | Facility: MEDICAL CENTER | Age: 29
End: 2024-12-04
Attending: FAMILY MEDICINE
Payer: COMMERCIAL

## 2024-12-04 DIAGNOSIS — Z02.0 SCHOOL HEALTH EXAMINATION: ICD-10-CM

## 2024-12-04 LAB
HBV SURFACE AB SERPL IA-ACNC: <3.5 MIU/ML (ref 0–10)
HCV AB SER QL: NORMAL
HIV 1+2 AB+HIV1 P24 AG SERPL QL IA: NORMAL
RUBV AB SER QL: 329 IU/ML

## 2024-12-04 PROCEDURE — 36415 COLL VENOUS BLD VENIPUNCTURE: CPT

## 2024-12-04 PROCEDURE — 86762 RUBELLA ANTIBODY: CPT

## 2024-12-04 PROCEDURE — 86765 RUBEOLA ANTIBODY: CPT

## 2024-12-04 PROCEDURE — 86803 HEPATITIS C AB TEST: CPT

## 2024-12-04 PROCEDURE — 87389 HIV-1 AG W/HIV-1&-2 AB AG IA: CPT

## 2024-12-04 PROCEDURE — 86706 HEP B SURFACE ANTIBODY: CPT

## 2024-12-04 PROCEDURE — 86787 VARICELLA-ZOSTER ANTIBODY: CPT

## 2024-12-05 ENCOUNTER — HOSPITAL ENCOUNTER (OUTPATIENT)
Facility: MEDICAL CENTER | Age: 29
End: 2024-12-05
Attending: FAMILY MEDICINE
Payer: COMMERCIAL

## 2024-12-05 PROCEDURE — 86480 TB TEST CELL IMMUN MEASURE: CPT

## 2024-12-06 LAB
GAMMA INTERFERON BACKGROUND BLD IA-ACNC: 0.04 IU/ML
M TB IFN-G BLD-IMP: NEGATIVE
M TB IFN-G CD4+ BCKGRND COR BLD-ACNC: 0 IU/ML
MEV IGG SER-ACNC: 105 AU/ML
MITOGEN IGNF BCKGRD COR BLD-ACNC: >10 IU/ML
QFT TB2 - NIL TBQ2: 0.01 IU/ML
VZV IGG SER IA-ACNC: 7.1 S/CO

## 2025-01-06 ENCOUNTER — HOSPITAL ENCOUNTER (OUTPATIENT)
Dept: LAB | Facility: MEDICAL CENTER | Age: 30
End: 2025-01-06
Attending: FAMILY MEDICINE
Payer: COMMERCIAL

## 2025-01-06 ENCOUNTER — OFFICE VISIT (OUTPATIENT)
Dept: MEDICAL GROUP | Facility: MEDICAL CENTER | Age: 30
End: 2025-01-06
Payer: COMMERCIAL

## 2025-01-06 VITALS
DIASTOLIC BLOOD PRESSURE: 62 MMHG | SYSTOLIC BLOOD PRESSURE: 116 MMHG | WEIGHT: 227 LBS | TEMPERATURE: 97.3 F | HEIGHT: 71 IN | HEART RATE: 74 BPM | OXYGEN SATURATION: 98 % | BODY MASS INDEX: 31.78 KG/M2

## 2025-01-06 DIAGNOSIS — Z02.0 SCHOOL PHYSICAL EXAM: ICD-10-CM

## 2025-01-06 PROCEDURE — 3078F DIAST BP <80 MM HG: CPT | Performed by: FAMILY MEDICINE

## 2025-01-06 PROCEDURE — 86787 VARICELLA-ZOSTER ANTIBODY: CPT

## 2025-01-06 PROCEDURE — 99213 OFFICE O/P EST LOW 20 MIN: CPT | Performed by: FAMILY MEDICINE

## 2025-01-06 PROCEDURE — 36415 COLL VENOUS BLD VENIPUNCTURE: CPT

## 2025-01-06 PROCEDURE — 3074F SYST BP LT 130 MM HG: CPT | Performed by: FAMILY MEDICINE

## 2025-01-06 ASSESSMENT — FIBROSIS 4 INDEX: FIB4 SCORE: 0.58

## 2025-01-06 NOTE — PROGRESS NOTES
Verbal consent was acquired by the patient to use [employer] ambient listening note generation during this visit:  Yes      Chief complaint::The encounter diagnosis was School physical exam.    Assessment and Plan:   The following treatment plan was discussed:     Assessment & Plan  1. School physical - History of childhood chickenpox and one dose of varicella vaccine in 2010  - Ordered varicella titer to determine immunity  - Blood work to be done today; results expected in 1-2 days  - If titer is positive, a letter will confirm no need for a second vaccine  - If negative, they will return for the second dose    Follow-up as needed  Ryan was seen today for immunizations.    Diagnoses and all orders for this visit:    School physical exam  -     VARICELLA ZOSTER IGG AB; Future        Followup: Return if symptoms worsen or fail to improve.    Subjective/HPI:     HPI:    Ryan Riddle is a pleasant 29 y.o. male here for   Chief Complaint   Patient presents with    Immunizations        History of Present Illness  The patient is a 29-year-old individual presenting for a varicella vaccine. They are uncertain about their history of chickenpox during childhood and inquire if they need to cancel their appointment tomorrow.    IMMUNIZATIONS  They already received one dose of the varicella vaccine in 2010.    Current Medicines (including changes today)  Current Outpatient Medications   Medication Sig Dispense Refill    albuterol 108 (90 Base) MCG/ACT Aero Soln inhalation aerosol Inhale 2 Puffs every 6 hours as needed for Shortness of Breath. 8.5 Each 4    montelukast (SINGULAIR) 10 MG Tab Take 1 Tablet by mouth every evening. 90 Tablet 3    Cholecalciferol (VITAMIN D) 50 MCG (2000 UT) Cap Take  by mouth.      Fluticasone Propionate (FLONASE ALLERGY RELIEF NA) Administer  into affected nostril(S). (Patient not taking: Reported on 1/6/2025)      Psyllium (METAMUCIL PO) Take  by mouth. (Patient not taking: Reported  "on 1/6/2025)       No current facility-administered medications for this visit.     Past Medical/ Surgical History  He  has a past medical history of Diverticulitis and Unspecified asthma(493.90).  He  has no past surgical history on file.       Objective:   /62   Pulse 74   Temp 36.3 °C (97.3 °F)   Ht 1.803 m (5' 11\")   Wt 103 kg (227 lb)   SpO2 98%  Body mass index is 31.66 kg/m².    Physical exam was made by observation   Physical Exam  Constitutional:       General: He is not in acute distress.  HENT:      Head: Normocephalic and atraumatic.   Eyes:      Conjunctiva/sclera: Conjunctivae normal.      Pupils: Pupils are equal, round, and reactive to light.   Pulmonary:      Effort: Pulmonary effort is normal. No respiratory distress.   Abdominal:      General: There is no distension.   Musculoskeletal:      Cervical back: Normal range of motion and neck supple.   Skin:     General: Skin is warm and dry.      Findings: No rash.   Neurological:      Mental Status: He is alert and oriented to person, place, and time.      Gait: Gait is intact.   Psychiatric:         Mood and Affect: Affect normal.          Lab/ Imaging Results:  No labs or imaging to review    Please note that this dictation was created using voice recognition software. I have made every reasonable attempt to correct obvious errors, but I expect that there are errors of grammar and possibly content that I did not discover before finalizing the note.      "

## 2025-01-08 LAB — VZV IGG SER IA-ACNC: 8.3 S/CO

## 2025-06-13 DIAGNOSIS — J45.30 MILD PERSISTENT ASTHMA WITHOUT COMPLICATION: ICD-10-CM

## 2025-06-13 RX ORDER — MONTELUKAST SODIUM 10 MG/1
10 TABLET ORAL EVERY EVENING
Qty: 90 TABLET | Refills: 3 | Status: SHIPPED | OUTPATIENT
Start: 2025-06-13

## 2025-06-13 RX ORDER — ALBUTEROL SULFATE 90 UG/1
2 INHALANT RESPIRATORY (INHALATION) EVERY 6 HOURS PRN
Qty: 8.5 EACH | Refills: 4 | Status: SHIPPED | OUTPATIENT
Start: 2025-06-13